# Patient Record
Sex: FEMALE | Race: WHITE | NOT HISPANIC OR LATINO | Employment: FULL TIME | URBAN - METROPOLITAN AREA
[De-identification: names, ages, dates, MRNs, and addresses within clinical notes are randomized per-mention and may not be internally consistent; named-entity substitution may affect disease eponyms.]

---

## 2017-02-02 ENCOUNTER — LAB VISIT (OUTPATIENT)
Dept: LAB | Facility: HOSPITAL | Age: 47
End: 2017-02-02
Attending: INTERNAL MEDICINE
Payer: COMMERCIAL

## 2017-02-02 DIAGNOSIS — D47.3 THROMBOCYTHEMIA, ESSENTIAL: ICD-10-CM

## 2017-02-02 DIAGNOSIS — D75.839 THROMBOCYTOSIS: ICD-10-CM

## 2017-02-02 LAB
BASOPHILS # BLD AUTO: 0.03 K/UL
BASOPHILS NFR BLD: 0.5 %
DIFFERENTIAL METHOD: ABNORMAL
EOSINOPHIL # BLD AUTO: 0.3 K/UL
EOSINOPHIL NFR BLD: 4.4 %
ERYTHROCYTE [DISTWIDTH] IN BLOOD BY AUTOMATED COUNT: 13 %
HCT VFR BLD AUTO: 38.3 %
HGB BLD-MCNC: 12.9 G/DL
LYMPHOCYTES # BLD AUTO: 1.7 K/UL
LYMPHOCYTES NFR BLD: 25.8 %
MCH RBC QN AUTO: 31.8 PG
MCHC RBC AUTO-ENTMCNC: 33.7 %
MCV RBC AUTO: 94 FL
MONOCYTES # BLD AUTO: 0.7 K/UL
MONOCYTES NFR BLD: 10.2 %
NEUTROPHILS # BLD AUTO: 3.8 K/UL
NEUTROPHILS NFR BLD: 59.1 %
NRBC BLD-RTO: 0 /100 WBC
PLATELET # BLD AUTO: 369 K/UL
PMV BLD AUTO: 10 FL
RBC # BLD AUTO: 4.06 M/UL
WBC # BLD AUTO: 6.4 K/UL

## 2017-02-02 PROCEDURE — 85025 COMPLETE CBC W/AUTO DIFF WBC: CPT | Mod: PN

## 2017-02-02 PROCEDURE — 85025 COMPLETE CBC W/AUTO DIFF WBC: CPT

## 2017-02-02 PROCEDURE — 36415 COLL VENOUS BLD VENIPUNCTURE: CPT | Mod: PN

## 2017-02-02 PROCEDURE — 81219 CALR GENE COM VARIANTS: CPT

## 2017-02-07 LAB
CALR FINAL DIAGNOSIS: NORMAL
CALR SPECIMEN TYPE: NORMAL

## 2018-12-13 ENCOUNTER — INITIAL CONSULT (OUTPATIENT)
Dept: RHEUMATOLOGY | Facility: CLINIC | Age: 48
End: 2018-12-13
Payer: COMMERCIAL

## 2018-12-13 ENCOUNTER — HOSPITAL ENCOUNTER (OUTPATIENT)
Dept: RADIOLOGY | Facility: HOSPITAL | Age: 48
Discharge: HOME | End: 2018-12-13
Attending: INTERNAL MEDICINE
Payer: COMMERCIAL

## 2018-12-13 VITALS
HEART RATE: 64 BPM | HEIGHT: 65 IN | SYSTOLIC BLOOD PRESSURE: 120 MMHG | WEIGHT: 168 LBS | BODY MASS INDEX: 27.99 KG/M2 | DIASTOLIC BLOOD PRESSURE: 80 MMHG

## 2018-12-13 DIAGNOSIS — M13.0 POLYARTHRITIS: Primary | ICD-10-CM

## 2018-12-13 DIAGNOSIS — M13.0 POLYARTHRITIS: ICD-10-CM

## 2018-12-13 PROCEDURE — 73130 X-RAY EXAM OF HAND: CPT | Mod: 50,TC,FY,PO

## 2018-12-13 PROCEDURE — 99205 OFFICE O/P NEW HI 60 MIN: CPT | Mod: S$GLB,,, | Performed by: INTERNAL MEDICINE

## 2018-12-13 PROCEDURE — 3008F BODY MASS INDEX DOCD: CPT | Mod: CPTII,S$GLB,, | Performed by: INTERNAL MEDICINE

## 2018-12-13 PROCEDURE — 99999 PR PBB SHADOW E&M-EST. PATIENT-LVL III: CPT | Mod: PBBFAC,,, | Performed by: INTERNAL MEDICINE

## 2018-12-13 PROCEDURE — 73130 X-RAY EXAM OF HAND: CPT | Mod: 26,50,, | Performed by: RADIOLOGY

## 2018-12-13 NOTE — PROGRESS NOTES
"Subjective:          Chief Complaint: Ruth Ann Dee is a 48 y.o. female who had concerns including Disease Management.    HPI:    Patient is a 48-year-old female primarily complained of fatigue earlier this year with her primary at a routine follow-up with stating that her fatigue was out of proportion to the exertion  She does have a past medical history for thrombocytosis seen with Dr. Valenzuela in past. Plt have been stable in 3-400s.   Her BEVERLEY at the most recent test 06/07/2018 is positive 1:1280 in a speckled pattern she had a previous BEVERLEY from 2015 that was negative remaining serologies of high rheumatoid factor negative x2.  Patient thus far with a hx of OA thus far.     She notes she was not feeling "well" or at her baseline for few months prior to June 2018 with generalized malaise, fatigue, she notes   No photosensitivity, no rashes. Diffuse hair loss no worrisome. No Raynaud,  No nephritis/ no hematuria  No ILD, pleurisy  No pericarditis/ +MVP stable  No IBD.   + thrombocytosis, compoleted w/up with Bianca as above. No DVT, seizure, miscarriages.x 1 <10 weeks  3 healthy kids   No thyroid thus far.     Bilateral lateral hips with pain,-PT, chiro ITB syndome. chronic  Hands: stiff painful. CMC joint pain. Right is very tender to severe, with certain motion or contact.   Does tolerate playing tennis.      Chronic since 2007 (approx)- seen with hand specialist. At that time. Negative pain with jars, bottles. Jewelry is stable.     AM stiffness is upper trapezius, cervical spine. , and greater troch region,     Family history of a mother with severe rheumatoid arthritis. Siblings with variety of differing autoimmunities: Sister: Sjogrens, Raynauds and ?DIL    Component      Latest Ref Rng & Units 6/7/2018   Rheumatoid Factor      0.0 - 15.0 IU/mL <8.6   Sed Rate      0 - 19 mm/Hr 9   CRP      0.00 - 0.90 mg/dL <0.50   HIV 1/2 Ag/Ab      Negative Negative   BEVERLEY IgG by IFA      <1:80 1:1280 (H)     REVIEW OF " SYSTEMS:    ROS            Objective:            Past Medical History:   Diagnosis Date    Bilateral Hand Osteoarthritis     10/20/15 RF, BEVERLEY, UA, ESR And CRP = Normal    Chronic Idiopathic Thrombocytosis     Dr. Kip Valenzuela's 11/20/15 OV Note Reviewed; He Ordered A Lab Work Up Ordered    Family Hx Of Breast Cancer     Family Hx Of Rheumatoid Arthritis     Her Mother  From This; 10/20/15 RF, BEVERLEY, UA, ESR And CRP = Normal    Family Hx Of SLE     10/20/15 RF, BEVERLEY, UA, ESR And CRP = Normal    Family Hx Of Thyroid Disorder     10/20/15 TSH And FT4 = Normal    Fibrocystic breast     Generalized Arthralgias C/W Fibromyalgia     10/20/15 RF, BEVERLEY, UA, ESR And CRP = Normal    Mildly Elevated Diastolic BP 2015     Echo Ordered 2015 And Low Na+ Diet RXd    MVP Ruled Out 10/2015, With H/O Palpitations     10/4/15 Echo (Dr. PERLA Olivas) = Entirely Normal     Family History   Problem Relation Age of Onset    Arthritis Mother     Cancer Mother     Hypertension Mother     Thyroid disease Mother     Osteoporosis Mother     Breast cancer Mother         50's    Hypertension Father     Breast cancer Paternal Grandmother     Breast cancer Paternal Cousin      Social History     Tobacco Use    Smoking status: Never Smoker   Substance Use Topics    Alcohol use: Not on file    Drug use: Not on file         Current Outpatient Medications on File Prior to Visit   Medication Sig Dispense Refill    meloxicam (MOBIC) 15 MG tablet Take 1 tablet (15 mg total) by mouth daily as needed for Pain. 90 tablet 1     No current facility-administered medications on file prior to visit.        Vitals:    18 1116   BP: 120/80   Pulse: 64       Physical Exam:    Physical Exam          Assessment:       Encounter Diagnosis   Name Primary?    Polyarthritis Yes          Plan:        Polyarthritis  -     Anti Sm/RNP Antibody; Future; Expected date: 2018  -     Anti-DNA antibody, double-stranded; Future; Expected date:  12/13/2018  -     Anti-Histone Antibody; Future; Expected date: 12/13/2018  -     Anti-thyroglobulin antibody; Future; Expected date: 12/13/2018  -     Anti-Smith antibody; Future; Expected date: 12/13/2018  -     C3 complement; Future; Expected date: 12/13/2018  -     C4 complement; Future; Expected date: 12/13/2018  -     Sjogrens syndrome-A extractable nuclear antibody; Future; Expected date: 12/13/2018  -     Sjogrens syndrome-B extractable nuclear antibody; Future; Expected date: 12/13/2018  -     Thyroid peroxidase antibody; Future; Expected date: 12/13/2018  -     Complement, total; Future; Expected date: 12/13/2018  -     Cyclic citrul peptide antibody, IgG; Future; Expected date: 12/13/2018    +BEVERLEY, with fatigue. No active synovitis. Suspect this is OA hands specifically as this is CMC joint involvement.   Discussed NSAIDs/tylenol, injections, bracing and ultimately surgery.   Mother with RA.   Exacerbated with cold and use.       Follow-up in about 4 months (around 4/13/2019).      60min consultation with greater than 50% spent in counseling, chart review and coordination of care. All questions answered.  Thank you for allowing me to participate in the care of this very pleasant patient.

## 2018-12-13 NOTE — PATIENT INSTRUCTIONS
[facility] [employer]  [facility] brace -[facility]/[employer]      Try Ibuprofen 600mg once or twice daily and let me know.

## 2018-12-13 NOTE — LETTER
December 23, 2018      Jasmin Swan MD  [address] [place] [address]           [place] [facility] [address] [facility] [address] [place] [address]  Phone: [phone]  Fax: [phone]          Patient: Ruth Ann Dee   MR Number: [id number]   YOB: 1970   Date of Visit: 12/13/2018       Dear Dr. Jasmin Swan:    Thank you for referring Ruth Ann Dee to me for evaluation. Attached you will find relevant portions of my assessment and plan of care.    If you have questions, please do not hesitate to call me. I look forward to following Ruth Ann Dee along with you.    Sincerely,    Maxine Potter, DO    Enclosure  CC:  No Recipients    If you would like to receive this communication electronically, please contact [email or web address] or [phone] to request more information on [employer] Link access.    For providers and/or their staff who would like to refer a patient to [facility], please contact us through our one-stop-shop provider referral line, [facility] [place], at [phone].    If you feel you have received this communication in error or would no longer like to receive these types of communications, please e-mail [email or web address]

## 2019-05-15 ENCOUNTER — OFFICE VISIT (OUTPATIENT)
Dept: RHEUMATOLOGY | Facility: CLINIC | Age: 49
End: 2019-05-15
Payer: COMMERCIAL

## 2019-05-15 VITALS
HEIGHT: 65 IN | WEIGHT: 168.31 LBS | HEART RATE: 68 BPM | BODY MASS INDEX: 28.04 KG/M2 | SYSTOLIC BLOOD PRESSURE: 133 MMHG | DIASTOLIC BLOOD PRESSURE: 86 MMHG

## 2019-05-15 DIAGNOSIS — R76.8 ANA POSITIVE: Primary | ICD-10-CM

## 2019-05-15 PROCEDURE — 99214 PR OFFICE/OUTPT VISIT, EST, LEVL IV, 30-39 MIN: ICD-10-PCS | Mod: S$GLB,,, | Performed by: INTERNAL MEDICINE

## 2019-05-15 PROCEDURE — 99214 OFFICE O/P EST MOD 30 MIN: CPT | Mod: S$GLB,,, | Performed by: INTERNAL MEDICINE

## 2019-05-15 PROCEDURE — 3008F BODY MASS INDEX DOCD: CPT | Mod: CPTII,S$GLB,, | Performed by: INTERNAL MEDICINE

## 2019-05-15 PROCEDURE — 3008F PR BODY MASS INDEX (BMI) DOCUMENTED: ICD-10-PCS | Mod: CPTII,S$GLB,, | Performed by: INTERNAL MEDICINE

## 2019-05-15 PROCEDURE — 99999 PR PBB SHADOW E&M-EST. PATIENT-LVL III: ICD-10-PCS | Mod: PBBFAC,,, | Performed by: INTERNAL MEDICINE

## 2019-05-15 PROCEDURE — 99999 PR PBB SHADOW E&M-EST. PATIENT-LVL III: CPT | Mod: PBBFAC,,, | Performed by: INTERNAL MEDICINE

## 2019-05-15 RX ORDER — CYCLOSPORINE 0.5 MG/ML
EMULSION OPHTHALMIC
COMMUNITY
Start: 2019-02-13 | End: 2020-10-02

## 2019-05-15 RX ORDER — ASPIRIN 81 MG/1
81 TABLET ORAL DAILY
COMMUNITY

## 2019-05-15 ASSESSMENT — ROUTINE ASSESSMENT OF PATIENT INDEX DATA (RAPID3)
MDHAQ FUNCTION SCORE: 0
PSYCHOLOGICAL DISTRESS SCORE: 0
PATIENT GLOBAL ASSESSMENT SCORE: 0
TOTAL RAPID3 SCORE: 1
PAIN SCORE: 3

## 2019-05-15 NOTE — PROGRESS NOTES
"Subjective:          Chief Complaint: Ruth Ann Dee is a 48 y.o. female who had concerns including Polyarthritis (4 months).    HPI:    Patient is a 48-year-old female primarily complained of fatigue earlier this year with her primary at a routine follow-up with stating that her fatigue was out of proportion to the exertion  She does have a past medical history for thrombocytosis seen with Dr. Valenzuela in past. Plt have been stable in 3-400s.   Did about 1 month ago have typic excessive fatigue this last spring.     Her BEVERLEY at the most recent test 06/07/2018 is positive 1:1280 in a speckled pattern she had a previous BEVERLEY from 2015 that was negative remaining serologies negative, rheumatoid factor negative x2. +TPO Ab.   ESR and CRP WNL  Patient thus far with a hx of OA thus far.   We tried using Ibuprofen with gastritis, she was using Mobic PRN stopped this. She is doing well aleve.         She notes she was not feeling "well" or at her baseline for few months prior to June 2018 with generalized malaise, fatigue, she notes   No photosensitivity, no rashes. Diffuse hair loss no worrisome. No Raynaud,  No nephritis/ no hematuria  No ILD, pleurisy  No pericarditis/ +MVP stable  No IBD.   + thrombocytosis, compoleted w/up with Bianca as above. No DVT, seizure, miscarriages.x 1 <10 weeks  3 healthy kids   No thyroid thus far.     Bilateral lateral hips with pain,-PT, chiro ITB syndome. chronic  Hands: stiff painful. CMC joint pain. Right is very tender to severe, with certain motion or contact.   Does tolerate playing tennis.      Chronic since 2007 (approx)- seen with hand specialist. At that time. Negative pain with jars, bottles. Jewelry is stable.     AM stiffness is upper trapezius, cervical spine. , and greater troch region,     Family history of a mother with severe rheumatoid arthritis. Siblings with variety of differing autoimmunities: Sister: Sjogrens, Raynauds and ?DIL    Component      Latest Ref Rng & Units " 12/13/2018 6/7/2018   Anti Sm/RNP Antibody      0.00 - 19.99 EU 2.87    Anti-Sm/RNP Interpretation      Negative Negative    Anti Sm Antibody      0.00 - 19.99 EU 1.68    Anti-Sm Interpretation      Negative Negative    Anti-SSA Antibody      0.00 - 19.99 EU 2.54    Anti-SSA Interpretation      Negative Negative    Anti-SSB Antibody      0.00 - 19.99 EU 0.98    Anti-SSB Interpretation      Negative Negative    Rheumatoid Factor      0.0 - 15.0 IU/mL  <8.6   HIV 1/2 Ag/Ab      Negative  Negative   BEVERLEY IgG by IFA      <1:80  1:1280 (H)   ds DNA Ab      Negative 1:10 Negative 1:10    Anti-Histone Antibody      0.0 - 0.9 Units 2.8 (H)    Thyroglobulin Ab Screen      0.0 - 3.9 IU/mL <4.0    Complement (C-3)      50 - 180 mg/dL 101    Complement (C-4)      11 - 44 mg/dL 17    Thyroperoxidase Antibodies      <6.0 IU/mL 11.0 (H)    Complement,Total, Serum      42 - 95 U/mL 51    CCP Antibodies      <5.0 U/mL 0.8      Component      Latest Ref Rng & Units 6/7/2018   Rheumatoid Factor      0.0 - 15.0 IU/mL <8.6   Sed Rate      0 - 19 mm/Hr 9   CRP      0.00 - 0.90 mg/dL <0.50   HIV 1/2 Ag/Ab      Negative Negative   BEVERLEY IgG by IFA      <1:80 1:1280 (H)     REVIEW OF SYSTEMS:    Review of Systems   Constitutional: Negative for fever, malaise/fatigue and weight loss.   HENT: Negative for sore throat.    Eyes: Negative for double vision, photophobia and redness.   Respiratory: Negative for cough, shortness of breath and wheezing.    Cardiovascular: Negative for chest pain, palpitations and orthopnea.   Gastrointestinal: Negative for abdominal pain, constipation and diarrhea.   Genitourinary: Negative for dysuria, hematuria and urgency.   Musculoskeletal: Positive for joint pain. Negative for back pain and myalgias.   Skin: Negative for rash.   Neurological: Negative for dizziness, tingling, focal weakness and headaches.   Endo/Heme/Allergies: Does not bruise/bleed easily.   Psychiatric/Behavioral: Negative for depression,  hallucinations and suicidal ideas.               Objective:            Past Medical History:   Diagnosis Date    Bilateral Hand Osteoarthritis     10/20/15 RF, BEVERLEY, UA, ESR And CRP = Normal    Chronic Idiopathic Thrombocytosis     Dr. Kip Valenzuela's 11/20/15 OV Note Reviewed; He Ordered A Lab Work Up Ordered    Family Hx Of Breast Cancer     Family Hx Of Rheumatoid Arthritis     Her Mother  From This; 10/20/15 RF, BEVERLEY, UA, ESR And CRP = Normal    Family Hx Of SLE     10/20/15 RF, BEVERLEY, UA, ESR And CRP = Normal    Family Hx Of Thyroid Disorder     10/20/15 TSH And FT4 = Normal    Fibrocystic breast     Generalized Arthralgias C/W Fibromyalgia     10/20/15 RF, BEVERLEY, UA, ESR And CRP = Normal    Mildly Elevated Diastolic BP 2015     Echo Ordered 2015 And Low Na+ Diet RXd    MVP Ruled Out 10/2015, With H/O Palpitations     10/4/15 Echo (Dr. PERLA Olivas) = Entirely Normal     Family History   Problem Relation Age of Onset    Arthritis Mother     Cancer Mother     Hypertension Mother     Thyroid disease Mother     Osteoporosis Mother     Breast cancer Mother         50's    Hypertension Father     Breast cancer Paternal Grandmother     Breast cancer Paternal Cousin      Social History     Tobacco Use    Smoking status: Never Smoker   Substance Use Topics    Alcohol use: Not on file    Drug use: Not on file         Current Outpatient Medications on File Prior to Visit   Medication Sig Dispense Refill    RESTASIS MULTIDOSE 0.05 % Drop       meloxicam (MOBIC) 15 MG tablet Take 1 tablet (15 mg total) by mouth daily as needed for Pain. 90 tablet 1     No current facility-administered medications on file prior to visit.        Vitals:    05/15/19 1005   BP: 133/86   Pulse: 68       Physical Exam:    Physical Exam   Constitutional: She is oriented to person, place, and time. She appears well-developed and well-nourished.   HENT:   Head: Normocephalic and atraumatic.   Mouth/Throat: Oropharynx is clear  and moist.   Eyes: Pupils are equal, round, and reactive to light. EOM are normal.   Neck: Normal range of motion.   Cardiovascular: Normal rate, regular rhythm and normal heart sounds.   Pulmonary/Chest: Effort normal and breath sounds normal.   Musculoskeletal:        Right shoulder: She exhibits normal range of motion, no tenderness and no swelling.        Left shoulder: She exhibits normal range of motion, no tenderness and no swelling.        Right elbow: She exhibits normal range of motion and no swelling. No tenderness found.        Left elbow: She exhibits normal range of motion and no swelling. No tenderness found.        Right wrist: She exhibits normal range of motion, no tenderness and no swelling.        Left wrist: She exhibits normal range of motion, no tenderness and no swelling.        Right knee: She exhibits normal range of motion and no swelling. No tenderness found.        Left knee: She exhibits normal range of motion and no swelling. No tenderness found.        Right hand: She exhibits normal range of motion, no tenderness and no swelling.        Left hand: She exhibits normal range of motion, no tenderness and no swelling.        Right foot: There is normal range of motion, no tenderness and no swelling.        Left foot: There is normal range of motion, no tenderness and no swelling.   Neurological: She is alert and oriented to person, place, and time.   Skin: Skin is warm and dry.   Psychiatric: She has a normal mood and affect. Her behavior is normal.             Assessment:       Encounter Diagnosis   Name Primary?    BEVERLEY positive Yes          Plan:        BEVERLEY positive  Comments:  I am not finding any active systemic disease today.          +BEVERLEY 1:1280 with negative SUE  , with fatigue. No active synovitis. Suspect this is OA hands specifically as this is CMC joint involvement.   Discussed NSAIDs/tylenol, injections, bracing and ultimately surgery.     Mother with RA.   Exacerbated with  cold and use.       Follow up in about 4 months (around 9/15/2019).      30min consultation with greater than 50% spent in counseling, chart review and coordination of care. All questions answered.  Thank you for allowing me to participate in the care of this very pleasant patient.

## 2020-09-14 ENCOUNTER — OFFICE VISIT (OUTPATIENT)
Dept: ENDOCRINOLOGY | Facility: CLINIC | Age: 50
End: 2020-09-14
Payer: COMMERCIAL

## 2020-09-14 VITALS
WEIGHT: 138.69 LBS | HEIGHT: 65 IN | OXYGEN SATURATION: 98 % | HEART RATE: 64 BPM | DIASTOLIC BLOOD PRESSURE: 72 MMHG | BODY MASS INDEX: 23.11 KG/M2 | SYSTOLIC BLOOD PRESSURE: 126 MMHG

## 2020-09-14 DIAGNOSIS — F41.9 ANXIETY: ICD-10-CM

## 2020-09-14 DIAGNOSIS — R00.2 PALPITATIONS: ICD-10-CM

## 2020-09-14 DIAGNOSIS — R94.6 ABNORMAL THYROID FUNCTION TEST: Primary | ICD-10-CM

## 2020-09-14 PROCEDURE — 3008F PR BODY MASS INDEX (BMI) DOCUMENTED: ICD-10-PCS | Mod: CPTII,S$GLB,, | Performed by: INTERNAL MEDICINE

## 2020-09-14 PROCEDURE — 99204 OFFICE O/P NEW MOD 45 MIN: CPT | Mod: S$GLB,,, | Performed by: INTERNAL MEDICINE

## 2020-09-14 PROCEDURE — 3008F BODY MASS INDEX DOCD: CPT | Mod: CPTII,S$GLB,, | Performed by: INTERNAL MEDICINE

## 2020-09-14 PROCEDURE — 99999 PR PBB SHADOW E&M-EST. PATIENT-LVL IV: ICD-10-PCS | Mod: PBBFAC,,, | Performed by: INTERNAL MEDICINE

## 2020-09-14 PROCEDURE — 99204 PR OFFICE/OUTPT VISIT, NEW, LEVL IV, 45-59 MIN: ICD-10-PCS | Mod: S$GLB,,, | Performed by: INTERNAL MEDICINE

## 2020-09-14 PROCEDURE — 99999 PR PBB SHADOW E&M-EST. PATIENT-LVL IV: CPT | Mod: PBBFAC,,, | Performed by: INTERNAL MEDICINE

## 2020-09-14 NOTE — ASSESSMENT & PLAN NOTE
H/o MVP now with almost daily palpations per pt. Bhargav r/o hyperthyroidism. Follow up with PCP. Pt likely needs baseline electrolytes checked and consider further cardiac testing/monitor.

## 2020-09-14 NOTE — PROGRESS NOTES
"    Subjective:    Patient ID:  Ruth Ann Dee is a 49 y.o. female.    Chief Complaint:  abnormal thyroid function test      Pt presents to establish care for abnormal thyroid function test.    Has history of mildly TPO antibodies in the past. PCP recently did blood work that showed elevated FT3/FT4 and normal TSH. Saw PCP for "stomach issues" and after ROS was decided to check thyroid test.    With regards to abnl thyroid test:    Not currently on any anti-thyroid or thyroid medication:    Current symptoms:   weight loss +ve but intentional 33 lb weight loss. On optivia diet since March.  fatigue -ve  Diarrhea +ve. Saw PCP for those sx's which are now improved. Notes alternating diarrhea and constipation   hair loss +ve  No hoarseness, voice changes, dysphagia, compressive symptoms, or head/neck exposure to XRT. No personal or FH of thyroid cancer or MEN syndrome. Mom had goiter.    Denies biotin use. No recent fever, URI, or TTP or thyroid. Denies excessive intake of kelp, seaweed, or iodine. Denies any herbal medications. Take MVI only. Denies recent contrast dye exposure. Denies taking amiodarone or lithium.     Thyroid ultrasound 9/14/2020     Narrative & Impression    EXAMINATION:  US SOFT TISSUE HEAD NECK THYROID     CLINICAL HISTORY:  Slow transit constipation     COMPARISON:  None.     FINDINGS:  Right thyroid lobe measures 4.6 x 1.4 x 1.2 cm.  Left thyroid lobe measures 4.8 x 1.0 x 1.5 cm.  Isthmus measures 0.1 cm.     Gland has a homogeneous echotexture.  No nodules are present.     Impression:     Normal sonographic appearance of the thyroid gland        Electronically signed by: Nirav Prince MD  Date:                                            09/14/2020              Review of Systems   Constitutional: Negative for diaphoresis, fatigue and unexpected weight change.   HENT: Negative for trouble swallowing and voice change.    Eyes: Negative for visual disturbance.   Respiratory: Negative for " shortness of breath.    Cardiovascular: Positive for palpitations. Negative for chest pain.   Gastrointestinal: Negative for abdominal pain.   Endocrine: Negative for cold intolerance and heat intolerance.   Musculoskeletal: Negative for myalgias.   Skin: Negative for wound.   Neurological: Negative for tremors and headaches.   Hematological: Negative for adenopathy.   Psychiatric/Behavioral: Negative for sleep disturbance. The patient is nervous/anxious.         Past Medical History:   Diagnosis Date    Bilateral Hand Osteoarthritis     10/20/15 RF, BEVERLEY, UA, ESR And CRP = Normal    Chronic Idiopathic Thrombocytosis     Dr. Kip Valenzuela's 11/20/15 OV Note Reviewed; He Ordered A Lab Work Up Ordered    Family Hx Of Breast Cancer     Family Hx Of Rheumatoid Arthritis     Her Mother  From This; 10/20/15 RF, BEVERLEY, UA, ESR And CRP = Normal    Family Hx Of SLE     10/20/15 RF, BEVERLEY, UA, ESR And CRP = Normal    Family Hx Of Thyroid Disorder     10/20/15 TSH And FT4 = Normal    Fibrocystic breast     Generalized Arthralgias C/W Fibromyalgia     10/20/15 RF, BEVERLEY, UA, ESR And CRP = Normal    Mildly Elevated Diastolic BP 2015     Echo Ordered 2015 And Low Na+ Diet RXd    MVP Ruled Out 10/2015, With H/O Palpitations     10/4/15 Echo (Dr. PERLA Olivas) = Entirely Normal      Social History     Tobacco Use    Smoking status: Never Smoker    Smokeless tobacco: Never Used   Substance Use Topics    Alcohol use: Yes     Alcohol/week: 0.0 standard drinks     Frequency: 2-4 times a month    Drug use: Not on file     Family History   Problem Relation Age of Onset    Arthritis Mother     Cancer Mother     Hypertension Mother     Thyroid disease Mother     Osteoporosis Mother     Breast cancer Mother         50's    Thyroid nodules Mother     Hypertension Father     Breast cancer Paternal Grandmother         age unknown    Breast cancer Paternal Cousin         age unknown    Raynaud syndrome Sister       Past  "Surgical History:   Procedure Laterality Date    BUNIONECTOMY      TUBAL LIGATION            Current Outpatient Medications:     aspirin (ECOTRIN) 81 MG EC tablet, Take 81 mg by mouth once daily., Disp: , Rfl:     MULTIVITAMIN ORAL, Take by mouth., Disp: , Rfl:     ondansetron (ZOFRAN ODT) 8 MG TbDL, Take 1 tablet (8 mg total) by mouth every 8 (eight) hours as needed., Disp: 20 tablet, Rfl: 0    tranexamic acid (LYSTEDA) 650 mg tablet, Take 650 mg by mouth. At onset of cycle, Disp: , Rfl:     psyllium (KONSYL) Powd, Take 5 mLs by mouth once daily. (Patient not taking: Reported on 9/14/2020), Disp: , Rfl:     RESTASIS MULTIDOSE 0.05 % Drop, , Disp: , Rfl:      Review of patient's allergies indicates:  No Known Allergies     Objective:   /72   Pulse 64   Ht 5' 5" (1.651 m)   Wt 62.9 kg (138 lb 10.7 oz)   SpO2 98%   BMI 23.08 kg/m²   BP Readings from Last 3 Encounters:   09/14/20 126/72   09/09/20 138/88   05/15/19 133/86     Wt Readings from Last 3 Encounters:   09/14/20 1405 62.9 kg (138 lb 10.7 oz)   09/09/20 1654 62.3 kg (137 lb 4.8 oz)   06/28/19 1152 76.2 kg (168 lb)          Physical Exam  Vitals signs reviewed.   Constitutional:       General: She is not in acute distress.     Appearance: She is well-developed.   HENT:      Head: Normocephalic and atraumatic.      Nose: Nose normal.   Eyes:      General: No scleral icterus.  Neck:      Thyroid: No thyromegaly.      Trachea: No tracheal deviation.      Comments:  No thyromegaly or palpable thyroid nodules  No thyroid bruit or TTP of thyroid  Cardiovascular:      Rate and Rhythm: Normal rate and regular rhythm.      Heart sounds: Normal heart sounds. No murmur.   Pulmonary:      Effort: Pulmonary effort is normal.      Breath sounds: Normal breath sounds. No wheezing or rales.   Abdominal:      General: Bowel sounds are normal. There is no distension.      Palpations: Abdomen is soft.      Tenderness: There is no abdominal tenderness. "   Musculoskeletal:         General: No swelling.   Lymphadenopathy:      Cervical: No cervical adenopathy.   Skin:     General: Skin is warm.   Neurological:      Mental Status: She is alert and oriented to person, place, and time.      Cranial Nerves: No cranial nerve deficit.      Deep Tendon Reflexes: Reflexes normal.      Comments: No tremor of hands     Psychiatric:         Thought Content: Thought content normal.           Lab Results   Component Value Date     06/07/2018     10/20/2015    K 4.4 06/07/2018    K 4.6 10/20/2015     06/07/2018     10/20/2015    CO2 27 06/07/2018    BUN 12 06/07/2018    CREATININE 0.60 06/07/2018    CREATININE 0.72 10/20/2015    GLU 79 06/07/2018    AST 17 06/07/2018    AST 21 12/11/2015    ALT 32 06/07/2018    ALBUMIN 4.2 06/07/2018    ALBUMIN 4.0 10/20/2015    PROT 7.4 06/07/2018    BILITOT 0.4 06/07/2018    WBC 9.40 06/07/2018    HGB 13.5 06/07/2018    HCT 40.3 06/07/2018    MCV 93 06/07/2018    MCH 31.3 (H) 06/07/2018     (H) 06/07/2018    MPV 10.8 06/07/2018    GRAN 6.2 06/07/2018    GRAN 66.1 06/07/2018    LYMPH 2.2 06/07/2018    LYMPH 23.6 06/07/2018    CHOL 155 06/07/2018    HDL 57 06/07/2018    LDLCALC 85.8 06/07/2018    LDLCALC 77 10/20/2015    TRIG 61 06/07/2018       Lab Results   Component Value Date    TSH 0.878 09/09/2020    FREET4 2.35 (H) 09/09/2020        Thyroid Labs Latest Ref Rng & Units 6/7/2018 12/13/2018 9/9/2020   TSH 0.400 - 4.000 uIU/mL 1.210 - 0.878   Free T4 0.78 - 2.19 ng/dL 1.20 - 2.35(H)   Sodium 136 - 145 mmol/L 139 - -   Potassium 3.5 - 5.1 mmol/L 4.4 - -   Chloride 95 - 110 mmol/L 101 - -   Carbon Dioxide 22 - 31 mmol/L 27 - -   Glucose 70 - 110 mg/dL 79 - -   Blood Urea Nitrogen 7 - 18 mg/dL 12 - -   Creatinine 0.50 - 1.40 mg/dL 0.60 - -   Calcium 8.4 - 10.2 mg/dL 9.7 - -   Total Protein 6.0 - 8.4 g/dL 7.4 - -   Albumin 3.5 - 5.2 g/dL 4.2 - -   Total Bilirubin 0.2 - 1.3 mg/dL 0.4 - -   AST 14 - 36 U/L 17 - -   ALT  10 - 44 U/L 32 - -   Anion Gap 8 - 16 mmol/L 11 - -   eGFR (African American) >60 mL/min/1.73 m:2 >60 - -   eGFR (Non-African American) >60 mL/min/1.73 m:2 >60 - -   WBC 3.90 - 12.70 K/uL 9.40 - -   RBC 4.00 - 5.40 M/uL 4.32 - -   Hemoglobin 12.0 - 16.0 g/dL 13.5 - -   Hematocrit 37.0 - 48.5 % 40.3 - -   MCV 82 - 98 fL 93 - -   MCH 27.0 - 31.0 pg 31.3(H) - -   MCHC 32.0 - 36.0 g/dL 33.5 - -   RDW 11.5 - 14.5 % 12.8 - -   Platelets 150 - 350 K/uL 362(H) - -   MPV 9.2 - 12.9 fL 10.8 - -   Gran # 1.8 - 7.7 K/uL 6.2 - -   Lymph # 1.0 - 4.8 K/uL 2.2 - -   Mono # 0.3 - 1.0 K/uL 0.8 - -   Eos # 0.0 - 0.5 K/uL 0.1 - -   Baso # 0.00 - 0.20 K/uL 0.03 - -   Gran % 38.0 - 73.0 % 66.1 - -   Lymph % 18.0 - 48.0 % 23.6 - -   Mono% 4.0 - 15.0 % 8.5 - -   Eos % 0.0 - 8.0 % 1.5 - -   Baso % 0.0 - 1.9 % 0.3 - -   Thyroglobulin, Antibody Screen 0.0 - 3.9 IU/mL - <4.0 -   Thyroperoxidase Antibodies 0.0 - 9.0 IU/mL - 11.0(H) 4.5         No results found for: HGBA1C      Assessment and plan:       Problem List Items Addressed This Visit        Psychiatric    Anxiety    Current Assessment & Plan     Will r/o overactive thyroid. Would also recommend work up for other causes.            Cardiac/Vascular    Palpitations    Current Assessment & Plan     H/o MVP now with almost daily palpations per pt. Bhargav r/o hyperthyroidism. Follow up with PCP. Pt likely needs baseline electrolytes checked and consider further cardiac testing/monitor.         Relevant Orders    T4    T3    T4, FREE, DIRECT DIALYSIS    TSH       Endocrine    Abnormal thyroid function test - Primary    Current Assessment & Plan     Clinically pt with anxiety and palpations. However, sx's not consistent with janes thyrotoxicosis and FT3 of 17. Suspect lab error in measuring free thyroid hormone levels. Will check total levels to confirm. Repeat TPO ab neg and thyroid ultrasound wnls.          Relevant Orders    T4    T3    T4, FREE, DIRECT DIALYSIS    TSH            Labs  now  RTC in prn

## 2020-09-14 NOTE — ASSESSMENT & PLAN NOTE
Clinically pt with anxiety and palpations. However, sx's not consistent with janes thyrotoxicosis and FT3 of 17. Suspect lab error in measuring free thyroid hormone levels. Will check total levels to confirm. Repeat TPO ab neg and thyroid ultrasound wnls.

## 2020-09-22 ENCOUNTER — PATIENT MESSAGE (OUTPATIENT)
Dept: ENDOCRINOLOGY | Facility: CLINIC | Age: 50
End: 2020-09-22

## 2020-10-02 PROBLEM — R94.6 ABNORMAL THYROID FUNCTION TEST: Status: RESOLVED | Noted: 2020-09-14 | Resolved: 2020-10-02

## 2020-11-13 PROBLEM — N32.81 OAB (OVERACTIVE BLADDER): Status: ACTIVE | Noted: 2020-11-13

## 2021-04-29 ENCOUNTER — PATIENT MESSAGE (OUTPATIENT)
Dept: RESEARCH | Facility: HOSPITAL | Age: 51
End: 2021-04-29

## 2021-07-19 ENCOUNTER — PATIENT MESSAGE (OUTPATIENT)
Dept: RHEUMATOLOGY | Facility: CLINIC | Age: 51
End: 2021-07-19

## 2022-02-26 PROBLEM — K81.0 ACUTE CHOLECYSTITIS: Status: ACTIVE | Noted: 2022-02-26

## 2022-03-10 ENCOUNTER — PATIENT MESSAGE (OUTPATIENT)
Dept: RHEUMATOLOGY | Facility: CLINIC | Age: 52
End: 2022-03-10
Payer: COMMERCIAL

## 2022-08-15 ENCOUNTER — OFFICE VISIT (OUTPATIENT)
Dept: UROLOGY | Facility: CLINIC | Age: 52
End: 2022-08-15
Payer: COMMERCIAL

## 2022-08-15 VITALS
BODY MASS INDEX: 24.94 KG/M2 | DIASTOLIC BLOOD PRESSURE: 93 MMHG | HEART RATE: 70 BPM | SYSTOLIC BLOOD PRESSURE: 149 MMHG | WEIGHT: 149.69 LBS | HEIGHT: 65 IN

## 2022-08-15 DIAGNOSIS — N39.46 MIXED STRESS AND URGE URINARY INCONTINENCE: Primary | ICD-10-CM

## 2022-08-15 PROCEDURE — 1159F PR MEDICATION LIST DOCUMENTED IN MEDICAL RECORD: ICD-10-PCS | Mod: CPTII,S$GLB,, | Performed by: UROLOGY

## 2022-08-15 PROCEDURE — 3080F DIAST BP >= 90 MM HG: CPT | Mod: CPTII,S$GLB,, | Performed by: UROLOGY

## 2022-08-15 PROCEDURE — 3008F PR BODY MASS INDEX (BMI) DOCUMENTED: ICD-10-PCS | Mod: CPTII,S$GLB,, | Performed by: UROLOGY

## 2022-08-15 PROCEDURE — 99999 PR PBB SHADOW E&M-EST. PATIENT-LVL III: ICD-10-PCS | Mod: PBBFAC,,, | Performed by: UROLOGY

## 2022-08-15 PROCEDURE — 99999 PR PBB SHADOW E&M-EST. PATIENT-LVL III: CPT | Mod: PBBFAC,,, | Performed by: UROLOGY

## 2022-08-15 PROCEDURE — 99204 PR OFFICE/OUTPT VISIT, NEW, LEVL IV, 45-59 MIN: ICD-10-PCS | Mod: S$GLB,,, | Performed by: UROLOGY

## 2022-08-15 PROCEDURE — 3077F PR MOST RECENT SYSTOLIC BLOOD PRESSURE >= 140 MM HG: ICD-10-PCS | Mod: CPTII,S$GLB,, | Performed by: UROLOGY

## 2022-08-15 PROCEDURE — 3077F SYST BP >= 140 MM HG: CPT | Mod: CPTII,S$GLB,, | Performed by: UROLOGY

## 2022-08-15 PROCEDURE — 3080F PR MOST RECENT DIASTOLIC BLOOD PRESSURE >= 90 MM HG: ICD-10-PCS | Mod: CPTII,S$GLB,, | Performed by: UROLOGY

## 2022-08-15 PROCEDURE — 3008F BODY MASS INDEX DOCD: CPT | Mod: CPTII,S$GLB,, | Performed by: UROLOGY

## 2022-08-15 PROCEDURE — 99204 OFFICE O/P NEW MOD 45 MIN: CPT | Mod: S$GLB,,, | Performed by: UROLOGY

## 2022-08-15 PROCEDURE — 1159F MED LIST DOCD IN RCRD: CPT | Mod: CPTII,S$GLB,, | Performed by: UROLOGY

## 2022-08-15 RX ORDER — PROGESTERONE 200 MG/1
200 CAPSULE ORAL NIGHTLY
COMMUNITY
Start: 2022-04-18 | End: 2023-02-01 | Stop reason: ALTCHOICE

## 2022-08-15 RX ORDER — LIDOCAINE HYDROCHLORIDE 20 MG/ML
JELLY TOPICAL ONCE
Status: CANCELLED | OUTPATIENT
Start: 2022-08-15 | End: 2022-08-15

## 2022-08-15 RX ORDER — DOXYCYCLINE HYCLATE 100 MG
100 TABLET ORAL ONCE
Status: CANCELLED | OUTPATIENT
Start: 2022-08-15 | End: 2022-08-15

## 2022-08-15 NOTE — PROGRESS NOTES
CHIEF COMPLAINT:    Mrs. Dee is a 51 y.o. female presenting as a self referred patient for mixed urinary incontinence.     PRESENTING ILLNESS:    Ruth Ann Dee is a 51 y.o. female who states she can recall having mixed incontinence since childhood.  She recalls episodes when she was 11 or 12 years old, playing in the bounce house and leaking urine on the slide.  As an 8 or 8th grader playing bumper car basket ball and losing large amounts of urine while laughing then bumping cars.  She has had large volume losses laughing in stores with her cousins.  She has been in situations where she had to use the waste paper basket in her office.  She did not see a urologist when she was a child because she did not complain to her mother about her incontinence.  She likes to play tennis and uses a large pad which is soaked afterwards.  She sometimes has bladder spasms.  No gross hematuria or recurrent UTI.  She goes through a I3 pad and changes it 3 times a day.  Nocturia x 0, but her first am void has significant urgency.    She states she was evaluated by Dr. Kassidy Vale who did what sounds like urodynamics, the patient tried Gelnique and Detrol LA.  Had an OK response. But not enough to continue medication.    She does urge suppression sits in her car, or tries to concentrate hard not to urinate before then going to the toilet.  This does not always work.  She is the  at [employer] in [place].    , vaginal deliveries, her largest child was 9 lb 2 oz, sexually active, no pain.  Bowels are normal.     REVIEW OF SYSTEMS:    Review of Systems   Constitutional: Negative.    HENT: Negative.    Eyes: Negative.    Respiratory: Negative.    Cardiovascular: Negative.    Gastrointestinal: Negative.    Genitourinary: Positive for frequency and urgency.        May have stress induced urge incontinence   Musculoskeletal: Negative.    Skin: Negative.    Neurological: Negative.     Endo/Heme/Allergies: Negative.    Psychiatric/Behavioral: Negative.        PATIENT HISTORY:    Past Medical History:   Diagnosis Date    a H/O Palpitations     a Mitral Valve Prolapse Ruled Out     10/4/15 Echo (Dr. PERLA Olivas) = Entirely Normal    e Abnormal Thyroid Function Tests     Dr. Juliette Sandifer ([facility]); Repeat TFTs Were Entirely Normal; In 2020    e Family Hx Of Thyroid Disorder     10/20/15 TSH And FT4 = Normal    g Chronic Idiopathic Thrombocytosis     Dr. Kip Valenzuela's 11/20/15 OV Note Reviewed; He Ordered A Lab Work Up Ordered    j Ascending Colon Diverticulosis     Dr. Kyle London    j Internal Hemorrhoids     Dr. Kyle mclain Family Hx Of Breast Cancer     k Fibrocystic Breast Changes     l Bilateral Hand Osteoarthritis     10/20/15 RF, BEVERLEY, UA, ESR And CRP = Normal    l Family Hx Of Rheumatoid Arthritis     Her Mother  From This; 10/20/15 RF, BEVERLEY, UA, ESR And CRP = Normal    l Family Hx Of SLE     10/20/15 RF, BEVERLEY, UA, ESR And CRP = Normal    l Generalized Arthralgias C/W Fibromyalgia ###    10/20/15 RF, BEVERLEY, UA, ESR And CRP = Normal    o Alopecia     10/2/20 Referred To Dr. Lilli Drummond    Wellness Visit 10/02/2020        Past Surgical History:   Procedure Laterality Date    breast lift      BREAST SURGERY      BUNIONECTOMY      COLONOSCOPY  2018    Dr. London    LAPAROSCOPIC CHOLECYSTECTOMY N/A 2022    Procedure: CHOLECYSTECTOMY, LAPAROSCOPIC;  Surgeon: Rosie Alejandra MD;  [facility];  Service: General;  Laterality: N/A;    TUBAL LIGATION         Family History   Problem Relation Age of Onset    Arthritis Mother     Cancer Mother     Hypertension Mother     Thyroid disease Mother     Osteoporosis Mother     Breast cancer Mother         50's    Thyroid nodules Mother     Hypertension Father     Pacemaker/defibrilator Father 76    Breast cancer Paternal Grandmother         age unknown    Breast cancer Paternal Cousin          age unknown    Raynaud syndrome Sister     Heart attack Cousin 47        (mom's side)     Heart attack Paternal Grandfather 82       Social History     Socioeconomic History    Marital status:    Tobacco Use    Smoking status: Never Smoker    Smokeless tobacco: Never Used   Substance and Sexual Activity    Alcohol use: Yes     Comment: occaisionally       Allergies:  Patient has no known allergies.    Medications:  Outpatient Encounter Medications as of 8/15/2022   Medication Sig Dispense Refill    ascorbic acid/zinc (ZINC WITH VITAMIN C ORAL) Take 3 tablets by mouth once daily.      aspirin (ECOTRIN) 81 MG EC tablet Take 81 mg by mouth once daily.      MULTIVITAMIN ORAL Take 1 tablet by mouth once daily.      vitamin D (VITAMIN D3) 1000 units Tab Take 1,000 Units by mouth once daily.      progesterone (PROMETRIUM) 200 MG capsule Take by mouth.       No facility-administered encounter medications on file as of 8/15/2022.         PHYSICAL EXAMINATION:    The patient generally appears in good health, is appropriately interactive, and is in no apparent distress.    Skin: No lesions.    Mental: Cooperative with normal affect.    Neuro: Grossly intact.    HEENT: Normal. No evidence of lymphadenopathy.    Chest:  normal inspiratory effort.    Abdomen:  Soft, non-tender. No masses or organomegaly. Bladder is not palpable. No evidence of flank discomfort. No evidence of inguinal hernia.    Extremities: No clubbing, cyanosis, or edema    Normal external female genitalia  Urethral meatus is normal  Urethra and bladder are nontender to bimanual exam  Well supported anteriorly and posteriorly   Uterus and cervix are normal  No adnexal masses  PVR by catheterization was 10 ml  Urethral mobility from 0-60 degrees    LABS:    Lab Results   Component Value Date    BUN 12 02/26/2022    CREATININE 0.60 02/26/2022     1.015, pH 7, otherwise, negative    IMPRESSION:    Encounter Diagnoses   Name Primary?     Mixed stress and urge urinary incontinence Yes       PLAN:    1.  For SUDS/cysto.  This may be stress induced urge incontinence.    2.  Incontinence brochure provided    I spent 45 minutes with the patient of which more than half was spent in direct consultation with the patient in regards to our treatment and plan.

## 2022-09-14 ENCOUNTER — PROCEDURE VISIT (OUTPATIENT)
Dept: UROLOGY | Facility: CLINIC | Age: 52
End: 2022-09-14
Payer: COMMERCIAL

## 2022-09-14 VITALS
BODY MASS INDEX: 24.99 KG/M2 | HEIGHT: 65 IN | TEMPERATURE: 99 F | HEART RATE: 70 BPM | SYSTOLIC BLOOD PRESSURE: 136 MMHG | WEIGHT: 150 LBS | DIASTOLIC BLOOD PRESSURE: 82 MMHG

## 2022-09-14 DIAGNOSIS — N39.46 MIXED STRESS AND URGE URINARY INCONTINENCE: ICD-10-CM

## 2022-09-14 PROCEDURE — 52000 PR CYSTOURETHROSCOPY: ICD-10-PCS | Mod: 59,S$GLB,, | Performed by: UROLOGY

## 2022-09-14 PROCEDURE — 51741 ELECTRO-UROFLOWMETRY FIRST: CPT | Mod: 26,51,S$GLB, | Performed by: UROLOGY

## 2022-09-14 PROCEDURE — 51784 ANAL/URINARY MUSCLE STUDY: CPT | Mod: 26,51,S$GLB, | Performed by: UROLOGY

## 2022-09-14 PROCEDURE — 51797 INTRAABDOMINAL PRESSURE TEST: CPT | Mod: 26,S$GLB,, | Performed by: UROLOGY

## 2022-09-14 PROCEDURE — 51728 CYSTOMETROGRAM W/VP: CPT | Mod: 26,S$GLB,, | Performed by: UROLOGY

## 2022-09-14 PROCEDURE — 51741 PR UROFLOWMETRY, COMPLEX: ICD-10-PCS | Mod: 26,51,S$GLB, | Performed by: UROLOGY

## 2022-09-14 PROCEDURE — 51728 PR COMPLEX CYSTOMETROGRAM VOIDING PRESSURE STUDIES: ICD-10-PCS | Mod: 26,S$GLB,, | Performed by: UROLOGY

## 2022-09-14 PROCEDURE — 52000 CYSTOURETHROSCOPY: CPT | Mod: 59,S$GLB,, | Performed by: UROLOGY

## 2022-09-14 PROCEDURE — 51784 PR ANAL/URINARY MUSCLE STUDY: ICD-10-PCS | Mod: 26,51,S$GLB, | Performed by: UROLOGY

## 2022-09-14 PROCEDURE — 51797 PR VOIDING PRESS STUDY INTRA-ABDOMINAL VOID: ICD-10-PCS | Mod: 26,S$GLB,, | Performed by: UROLOGY

## 2022-09-14 RX ORDER — DOXYCYCLINE HYCLATE 100 MG
100 TABLET ORAL ONCE
Status: COMPLETED | OUTPATIENT
Start: 2022-09-14 | End: 2022-09-14

## 2022-09-14 RX ORDER — LIDOCAINE HYDROCHLORIDE 20 MG/ML
JELLY TOPICAL ONCE
Status: COMPLETED | OUTPATIENT
Start: 2022-09-14 | End: 2022-09-14

## 2022-09-14 RX ADMIN — LIDOCAINE HYDROCHLORIDE: 20 JELLY TOPICAL at 02:09

## 2022-09-14 RX ADMIN — Medication 100 MG: at 02:09

## 2022-09-14 NOTE — PROCEDURES
Procedures    Urodynamic Report    Indication:  mixed incontinence    Patient was taken to the Urodynamic Suite with a comfortably full bladder and asked to perform a free uroflow.  Next, the patient was prepped and the urinary residual was drained with a 14 Fr catheter.  A 7 Fr dual lumen catheter was placed to measure intravesical pressures.  A 10 Fr balloon manometer was placed into the rectum for abdominal pressure measurements.  Patch EMG electrodes were placed on the perineum.  The patient was connected to the [employer] Urodynamic machine, using a multichannel technique, the data were interpreted.  The bladder was filled with sterile water at room temperature at a rate of 30 ml/min.  Patient is filled to urgency.  Filling is performed with the patient in the seated position.  Abdominal leak point pressures are checked at 1st desire, then serially at 50cc increments first with Valsalva then with coughing.  The patient was then asked to sit and void for a pressure flow study.    The following are the results of the study:  1.  Uroflow       Q max:  9.7 ml/sec       Voided volume:  43.5 ml       Pattern of the curve:  continuous    2.  PVR:  5 ml    3.  CMG       Sensation:         First Desire:  48.8 ml         Normal Desire:  73.2 ml         Strong Desire:  84.8 ml         Urgency:  315.1 ml       Capacity:  356.4 ml       Abnormal Contractions:  none       Compliance:  normal    4.  Abdominal Leak Point Pressure:       Valsalva:  27.6 cm H2O       Cough:  51.4 cm H2O    5.  EMG:  normal guarding, increased just before voiding    6.  Voiding phase  Patient states this was very atypical for her.  Normally, she has a strong stream and is done before others when she is in a public toilet       Q max:  11.1 ml/sec       P det at Q max:  26 cm H2O       Pattern of the curve:  prolonged, intermittent       Voided volume:  351.4 ml       PVR:  5 ml     7.  Analysis:  increased sensation, decreased capacity, stress  incontinence and empties well.      8.  Recommendations:       a.  She has a very low leak point pressure so recommended a retropubic sling.  She may still need to have a urethral bulking agent after       b.  Normally, I would recommend treating the urge component first, however, because her leak point pressure is so low, I doubt the treatments for the urge would be noticeably effective       c.  Discussed that she may still have urgency and frequency, urge incontinence after the sling.  She expressed understanding       D.  Consent signed      CYSTOSCOPY REPORT    Pre Procedure Diagnosis:  mixed incontinence    Post Procedure Diagnosis:  normal lower urinary tract    Anesthesia: 10 cc 2% lidocaine jelly applied per urethra.    14 FR Flexible Olympus cystoscope used.    FINDINGS:  Dome, anterior, posterior, lateral walls and bladder base free of urothelial abnormalities. Right and left ureteral orifices in the normal postion and configuration, both effluxed clear urine.  Bladder neck and urethra were normal.    Specimen:  none    The patient was taken to the cystoscopy suite and placed in dorsal lithotomy position.  The genitalia was prepped and draped  in the usual sterile fashion.  Two percent lidocaine jelly was inserted in the urethra.  After sufficent time had passed to allow good local anesthesia, the cystoscope was inserted in the urethra and passed into the bladder visualizing the urethra along its entire course.  The dome, anterior, posterior and lateral walls were examined systematically.  The ureteral orifices were in their usual position and configuration.  The cystoscope was turned upon itself 180 degrees to visualize the bladder neck.  The cystoscope was then brought to the level of the bladder neck, the water was turned on and the urethra was visualized.  The cystoscope was removed and the patient was instructed to urinate prior to leaving the office.     Post procedure medication:  doxycycline 100  mg x 1     ASSESSMENT/PLAN:  51 year old woman status post flexible cystoscopy.  1. Push fluids for 24 hours.  2. May see blood in the urine, this should gradually improve over the next 2-3 days.  3. The patient was instructed to return to the office or go to the emergency should fever, chills, cloudy urine, or inability to urinate develop.  4. Follow up as above.

## 2022-09-14 NOTE — PATIENT INSTRUCTIONS
_                                                                                                                                                                                             If any problems after hours or weekends, you may call [phone] and ask for the urology resident on call. SIMPLE URODYNAMIC STUDY (SUDS) & CYSTOSCOPY  UROLOGY CLINIC DISCHARGE INSTRUCTIONS    You have had a procedure that will require time to properly heal. Follow the instructions you have been given on how to care for yourself once you are home. Below is additional information to help in your recovery.    ACTIVITY  There are no restrictions in activity. Start doing again the things you did before the procedure.  You may experience a slight burning sensation. You may notice a small amount of blood in your urine. This will clear up within a day. Call the clinic if this continues beyond 48 hours.    DIET  Continue your normal diet. You may eat the same foods you ate before your procedure.  Drink plenty of fluids during the first 24-48 hours following your procedure.    MEDICATIONS  Resume all other previous medications from your prescribing physician.  Continue any pre=procedure antibiotics until they are all gone.    SIGNS AND SYMPTOMS TO REPORT TO THE DOCTOR  Chills or fever greater than 101° F within 24 hours of procedure.  Changes in urination, such as increased bleeding, foul smell, cloudy urine, or painful urination.  Call your doctor with any questions or concerns.    For any emergency situation, call 911 immediately or go to your nearest emergency room.    [facility]  [phone]

## 2022-09-15 ENCOUNTER — PATIENT MESSAGE (OUTPATIENT)
Dept: UROLOGY | Facility: CLINIC | Age: 52
End: 2022-09-15
Payer: COMMERCIAL

## 2022-09-16 ENCOUNTER — TELEPHONE (OUTPATIENT)
Dept: UROLOGY | Facility: CLINIC | Age: 52
End: 2022-09-16
Payer: COMMERCIAL

## 2022-09-16 DIAGNOSIS — N39.46 MIXED STRESS AND URGE URINARY INCONTINENCE: Primary | ICD-10-CM

## 2022-10-24 ENCOUNTER — TELEPHONE (OUTPATIENT)
Dept: UROLOGY | Facility: CLINIC | Age: 52
End: 2022-10-24
Payer: COMMERCIAL

## 2022-10-24 ENCOUNTER — ANESTHESIA EVENT (OUTPATIENT)
Dept: SURGERY | Facility: HOSPITAL | Age: 52
End: 2022-10-24
Payer: COMMERCIAL

## 2022-10-24 PROBLEM — N39.46 MIXED INCONTINENCE URGE AND STRESS: Status: ACTIVE | Noted: 2022-10-24

## 2022-10-24 NOTE — TELEPHONE ENCOUNTER
Called pt to confirm arrival time of 5am for procedure on 10/25/22. Gave pt NPO instructions and gave pt opportunity to ask questions. Pt verbalized understanding.

## 2022-10-24 NOTE — ANESTHESIA PREPROCEDURE EVALUATION
[facility]  Anesthesia Pre-Operative Evaluation         Patient Name/: Ruth Ann Dee, 1970  MRN: [id number]    SUBJECTIVE:     Pre-operative evaluation for Procedure(s) (LRB):  SLING, RETROPUBIC WITH SPARC (N/A)  CYSTOSCOPY (N/A)     10/24/2022    Ruth Ann Dee is a 52 y.o. female w/ a significant PMHx of MVP, and anxiety. She has been dealing with incontinence issues, and now plans for retropubic sling.     Patient now presents for the above procedure(s).    Prev airway: None documented.    Patient Active Problem List   Diagnosis    MVP With Palpitations    Mildly Elevated Diastolic BP 2015    Bilateral Hand Osteoarthritis    Generalized Arthralgias    Family Hx Of Rheumatoid Arthritis    Family Hx Of Thyroid Disorder    Family Hx Of Breast Cancer    Family Hx Of SLE    Chronic Idiopathic Thrombocytosis    Palpitations    Anxiety    Fibrocystic Breast Changes    H/O Palpitations    Internal Hemorrhoids    Ascending Colon Diverticulosis    Alopecia    Abnormal Thyroid Function Tests    OAB (overactive bladder)    Acute cholecystitis    Precordial pain       Review of patient's allergies indicates:  No Known Allergies    Current Inpatient Medications:       No current facility-administered medications on file prior to encounter.     Current Outpatient Medications on File Prior to Encounter   Medication Sig Dispense Refill    ascorbic acid/zinc (ZINC WITH VITAMIN C ORAL) Take 3 tablets by mouth once daily.      aspirin (ECOTRIN) 81 MG EC tablet Take 81 mg by mouth once daily.      MULTIVITAMIN ORAL Take 1 tablet by mouth once daily.      progesterone (PROMETRIUM) 200 MG capsule Take by mouth.      vitamin D (VITAMIN D3) 1000 units Tab Take 1,000 Units by mouth once daily.         Past Surgical History:   Procedure Laterality Date    breast lift      BREAST SURGERY      BUNIONECTOMY      COLONOSCOPY  2018    Dr. London    LAPAROSCOPIC  CHOLECYSTECTOMY N/A 2/26/2022    Procedure: CHOLECYSTECTOMY, LAPAROSCOPIC;  Surgeon: Rosie Alejandra MD;  Location: [facility] OR;  Service: General;  Laterality: N/A;    TUBAL LIGATION         Social History:  Tobacco Use: Low Risk     Smoking Tobacco Use: Never    Smokeless Tobacco Use: Never    Passive Exposure: Not on file       Alcohol Use: Not on file       OBJECTIVE:     Vital Signs Range:  BMI Readings from Last 1 Encounters:   09/14/22 24.96 kg/m²               Significant Labs:        Component Value Date/Time    WBC 13.92 (H) 02/26/2022 1013    HGB 14.9 02/26/2022 1013    HCT 44.1 02/26/2022 1013     (H) 02/26/2022 1013     02/26/2022 1013     10/20/2015 0926    K 4.3 02/26/2022 1013    K 4.6 10/20/2015 0926     02/26/2022 1013     10/20/2015 0926    CO2 30 02/26/2022 1013     02/26/2022 1013    BUN 12 02/26/2022 1013    CREATININE 0.60 02/26/2022 1013    CREATININE 0.72 10/20/2015 0926    MG 1.7 02/16/2022 1018    PHOS 4.2 11/13/2020 1148    CALCIUM 9.6 02/26/2022 1013    ALBUMIN 4.6 02/26/2022 1013    ALBUMIN 4.0 10/20/2015 0926    PROT 8.3 02/26/2022 1013    ALKPHOS 54 02/26/2022 1013    BILITOT 0.5 02/26/2022 1013    AST 31 02/26/2022 1013    AST 21 12/11/2015 1156    ALT 24 02/26/2022 1013        Please see Results Review for additional labs.     Diagnostic Studies: No relevant studies.    EKG:   Results for orders placed or performed during the hospital encounter of 02/26/22   EKG 12-lead    Collection Time: 02/26/22 10:14 AM    Narrative    Test Reason : R07.9,    Vent. Rate : 085 BPM     Atrial Rate : 085 BPM     P-R Int : 136 ms          QRS Dur : 082 ms      QT Int : 356 ms       P-R-T Axes : 083 078 055 degrees     QTc Int : 423 ms    Normal sinus rhythm  Normal ECG  No previous ECGs available  Confirmed by Tobi RICHTER, Michael KNAPP ([id number]) on 2/26/2022 10:27:33 AM    Referred By:             Confirmed By:Michael Britton MD       ECHO:  No results found for  this or any previous visit.        ASSESSMENT/PLAN:   \  Pre-op Assessment    I have reviewed the Patient Summary Reports.    I have reviewed the NPO Status.   I have reviewed the Medications.     Review of Systems  Anesthesia Hx:  No problems with previous Anesthesia  Denies Family Hx of Anesthesia complications.   Denies Personal Hx of Anesthesia complications.   Social:  Non-Smoker    Hematology/Oncology:  Hematology Normal   Oncology Normal     Cardiovascular:  Cardiovascular Normal Exercise tolerance: good     Pulmonary:  Pulmonary Normal    Renal/:  Renal/ Normal     Musculoskeletal:   Arthritis     Neurological:  Neurology Normal    Endocrine:  Endocrine Normal    Psych:   anxiety          Physical Exam  General: Well nourished, Cooperative, Alert and Oriented    Airway:  Mallampati: II / I  Mouth Opening: Normal  TM Distance: Normal  Tongue: Normal  Neck ROM: Normal ROM    Dental:  Intact, Retainer        Anesthesia Plan  Type of Anesthesia, risks & benefits discussed:    Anesthesia Type: Gen ETT  Intra-op Monitoring Plan: Standard ASA Monitors  Post Op Pain Control Plan: multimodal analgesia and IV/PO Opioids PRN  Induction:  IV  Airway Plan: Direct, Post-Induction  Informed Consent: Informed consent signed with the Patient and all parties understand the risks and agree with anesthesia plan.  All questions answered.   ASA Score: 1  Day of Surgery Review of History & Physical: H&P Update referred to the surgeon/provider.    Ready For Surgery From Anesthesia Perspective.     .

## 2022-10-24 NOTE — H&P
Urology ([facility]) H&P  Staff: Samantha Cardona MD    CC: Mixed urinary incontinence    HPI:  Ruth Ann Dee is a 52 y.o. female with mixed urinary incontinence.    She states she has had mixed urinary incontinence for many years dating back to childhood.    S/p SUDS/cysto in 2022. SUDS demonstrated capacity to be 354 mL w/ early sensation. Normal compliance and no DO. ALPP 27.6. On voiding, Qmax 11.1, PdetQmax 26 (but patient states this was not representative of her normal voiding pattern). PVR 5 mL. The cystoscopy portion was unremarkable.    , vaginal deliveries, her largest child was 9 lb 2 oz, sexually active, no pain.  Bowels are normal.     Although it's listed in the chart, she does not take ASA 81.    ROS:  Neg except per HPI    Past Medical History:   Diagnosis Date    a H/O Palpitations     a Mitral Valve Prolapse Ruled Out     10/4/15 Echo (Dr. PERLA Olivas) = Entirely Normal    e Abnormal Thyroid Function Tests     Dr. Juliette Sandifer ([facility]); Repeat TFTs Were Entirely Normal; In 2020    e Family Hx Of Thyroid Disorder     10/20/15 TSH And FT4 = Normal    g Chronic Idiopathic Thrombocytosis     Dr. Kip Valenzuela's 11/20/15 OV Note Reviewed; He Ordered A Lab Work Up Ordered    j Ascending Colon Diverticulosis     Dr. Kyle London    j Internal Hemorrhoids     Dr. Kyle mclain Family Hx Of Breast Cancer     k Fibrocystic Breast Changes     l Bilateral Hand Osteoarthritis     10/20/15 RF, BEVERLEY, UA, ESR And CRP = Normal    l Family Hx Of Rheumatoid Arthritis     Her Mother  From This; 10/20/15 RF, BEVERLEY, UA, ESR And CRP = Normal    l Family Hx Of SLE     10/20/15 RF, BEVERLEY, UA, ESR And CRP = Normal    l Generalized Arthralgias C/W Fibromyalgia ###    10/20/15 RF, BEVERLEY, UA, ESR And CRP = Normal    o Alopecia     10/2/20 Referred To Dr. Lilli Drummond    Wellness Visit 10/02/2020        Past Surgical History:   Procedure Laterality Date    breast lift      BREAST SURGERY       BUNIONECTOMY      COLONOSCOPY  01/22/2018    Dr. London    LAPAROSCOPIC CHOLECYSTECTOMY N/A 2/26/2022    Procedure: CHOLECYSTECTOMY, LAPAROSCOPIC;  Surgeon: Rosie Alejandra MD;  Location: [facility];  Service: General;  Laterality: N/A;    TUBAL LIGATION         Social History     Socioeconomic History    Marital status:    Tobacco Use    Smoking status: Never    Smokeless tobacco: Never   Substance and Sexual Activity    Alcohol use: Yes     Comment: occaisionally       Family History   Problem Relation Age of Onset    Arthritis Mother     Cancer Mother     Hypertension Mother     Thyroid disease Mother     Osteoporosis Mother     Breast cancer Mother         50's    Thyroid nodules Mother     Hypertension Father     Pacemaker/defibrilator Father 76    Breast cancer Paternal Grandmother         age unknown    Breast cancer Paternal Cousin         age unknown    Raynaud syndrome Sister     Heart attack Cousin 47        (mom's side)     Heart attack Paternal Grandfather 82       Review of patient's allergies indicates:  No Known Allergies    No current facility-administered medications on file prior to encounter.     Current Outpatient Medications on File Prior to Encounter   Medication Sig Dispense Refill    aspirin (ECOTRIN) 81 MG EC tablet Take 81 mg by mouth once daily.      ascorbic acid/zinc (ZINC WITH VITAMIN C ORAL) Take 3 tablets by mouth once daily.      MULTIVITAMIN ORAL Take 1 tablet by mouth once daily.      progesterone (PROMETRIUM) 200 MG capsule Take 200 mg by mouth every evening.      vitamin D (VITAMIN D3) 1000 units Tab Take 1,000 Units by mouth once daily.         Anticoagulation:  Yes - ASA 81    Physical Exam:  General: No acute distress, well developed. AAOx3  Head: Normocephalic, Atraumatic  Eyes: Extra-occular movements intact, No discharge  Neck: supple, symmetrical, trachea midline  Lungs: normal respiratory effort, no respiratory distress, no wheezes  CV: regular rate, 2+  pulses  Abdomen: soft, non-tender, non-distended, no organomegaly  MSK: no edema, no deformities, normal ROM  Skin: skin color, texture, turgor normal.  Neurologic: no focal deficits, sensation intact    Labs:    Urine dipstick today - SG 1.015, pH 5, negative for nitrites, leukocytes, and blood.    Lab Results   Component Value Date    WBC 13.92 (H) 02/26/2022    HGB 14.9 02/26/2022    HCT 44.1 02/26/2022    MCV 94 02/26/2022     (H) 02/26/2022           BMP  Lab Results   Component Value Date     02/26/2022    K 4.3 02/26/2022     02/26/2022    CO2 30 02/26/2022    BUN 12 02/26/2022    CREATININE 0.60 02/26/2022    CALCIUM 9.6 02/26/2022    ANIONGAP 7 (L) 02/26/2022    ESTGFRAFRICA >60 02/26/2022    EGFRNONAA >60 02/26/2022       Imaging:  N/A    Assessment: Ruth Ann Dee is a 52 y.o. female with mixed urinary incontinence.    Plan:     1. To OR today for retropubic MUS and cystoscopy.  2. Consents signed   3. I have explained the risk, benefits, and alternatives of the procedure in detail. The patient voices understanding and all questions have been answered. The patient agrees to proceed as planned.      Leroy Coker MD    Patient seen in holding.  No changes in clinical condition.  Proceed with planned procedure.       [Fever] : fever [Chills] : chills [Fatigue] : fatigue [Recent Change In Weight] : ~T recent weight change [Cough] : cough [Negative] : Heme/Lymph [FreeTextEntry9] : myalgias

## 2022-10-24 NOTE — PRE-PROCEDURE INSTRUCTIONS
PreOp Instructions given:   - Verbal medication information (what to hold and what to take)   - NPO guidelines   - Arrival place directions given; time to be given the day before procedure by the   Surgeon's Office 0500 - [facility]  - Bathing with antibacterial soap   - Don't wear any jewelry or bring any valuables AM of surgery   - No makeup or moisturizer to face   - No perfume/cologne, powder, lotions or aftershave   Pt. verbalized understanding.   Pt denies any h/o Anesthesia/Sedation complications or side effects.

## 2022-10-24 NOTE — TELEPHONE ENCOUNTER
----- Message from Jame George MA sent at 10/24/2022 11:38 AM CDT -----  Contact: [phone]  Patient is calling for arrival time for procedure, please call and advise.

## 2022-10-25 ENCOUNTER — HOSPITAL ENCOUNTER (OUTPATIENT)
Facility: HOSPITAL | Age: 52
Discharge: HOME | End: 2022-10-26
Attending: UROLOGY | Admitting: UROLOGY
Payer: COMMERCIAL

## 2022-10-25 ENCOUNTER — ANESTHESIA (OUTPATIENT)
Dept: SURGERY | Facility: HOSPITAL | Age: 52
End: 2022-10-25
Payer: COMMERCIAL

## 2022-10-25 DIAGNOSIS — N39.46 MIXED INCONTINENCE URGE AND STRESS: Primary | ICD-10-CM

## 2022-10-25 LAB
ANION GAP SERPL CALC-SCNC: 4 MMOL/L (ref 8–16)
B-HCG UR QL: NEGATIVE
BASOPHILS # BLD AUTO: 0.02 K/UL (ref 0–0.2)
BASOPHILS NFR BLD: 0.2 % (ref 0–1.9)
BUN SERPL-MCNC: 8 MG/DL (ref 6–20)
CALCIUM SERPL-MCNC: 7.7 MG/DL (ref 8.7–10.5)
CHLORIDE SERPL-SCNC: 112 MMOL/L (ref 95–110)
CO2 SERPL-SCNC: 20 MMOL/L (ref 23–29)
CREAT SERPL-MCNC: 0.6 MG/DL (ref 0.5–1.4)
CTP QC/QA: YES
DIFFERENTIAL METHOD: ABNORMAL
EOSINOPHIL # BLD AUTO: 0.1 K/UL (ref 0–0.5)
EOSINOPHIL NFR BLD: 0.9 % (ref 0–8)
ERYTHROCYTE [DISTWIDTH] IN BLOOD BY AUTOMATED COUNT: 12.4 % (ref 11.5–14.5)
EST. GFR  (NO RACE VARIABLE): >60 ML/MIN/1.73 M^2
GLUCOSE SERPL-MCNC: 93 MG/DL (ref 70–110)
HCT VFR BLD AUTO: 38.2 % (ref 37–48.5)
HGB BLD-MCNC: 12.4 G/DL (ref 12–16)
IMM GRANULOCYTES # BLD AUTO: 0.04 K/UL (ref 0–0.04)
IMM GRANULOCYTES NFR BLD AUTO: 0.4 % (ref 0–0.5)
LYMPHOCYTES # BLD AUTO: 1 K/UL (ref 1–4.8)
LYMPHOCYTES NFR BLD: 10.5 % (ref 18–48)
MCH RBC QN AUTO: 31.7 PG (ref 27–31)
MCHC RBC AUTO-ENTMCNC: 32.5 G/DL (ref 32–36)
MCV RBC AUTO: 98 FL (ref 82–98)
MONOCYTES # BLD AUTO: 0.2 K/UL (ref 0.3–1)
MONOCYTES NFR BLD: 1.7 % (ref 4–15)
NEUTROPHILS # BLD AUTO: 7.9 K/UL (ref 1.8–7.7)
NEUTROPHILS NFR BLD: 86.3 % (ref 38–73)
NRBC BLD-RTO: 0 /100 WBC
PLATELET # BLD AUTO: 281 K/UL (ref 150–450)
PMV BLD AUTO: 10.4 FL (ref 9.2–12.9)
POTASSIUM SERPL-SCNC: 4 MMOL/L (ref 3.5–5.1)
RBC # BLD AUTO: 3.91 M/UL (ref 4–5.4)
SODIUM SERPL-SCNC: 136 MMOL/L (ref 136–145)
WBC # BLD AUTO: 9.15 K/UL (ref 3.9–12.7)

## 2022-10-25 PROCEDURE — 52005 CYSTO W/URTRL CATHJ: CPT | Mod: ,,, | Performed by: UROLOGY

## 2022-10-25 PROCEDURE — 71000033 HC RECOVERY, INTIAL HOUR: Performed by: UROLOGY

## 2022-10-25 PROCEDURE — 36000707: Performed by: UROLOGY

## 2022-10-25 PROCEDURE — 85025 COMPLETE CBC W/AUTO DIFF WBC: CPT | Performed by: STUDENT IN AN ORGANIZED HEALTH CARE EDUCATION/TRAINING PROGRAM

## 2022-10-25 PROCEDURE — 63600175 PHARM REV CODE 636 W HCPCS: Performed by: STUDENT IN AN ORGANIZED HEALTH CARE EDUCATION/TRAINING PROGRAM

## 2022-10-25 PROCEDURE — 71000016 HC POSTOP RECOV ADDL HR: Performed by: UROLOGY

## 2022-10-25 PROCEDURE — 74420 UROGRAPHY RTRGR +-KUB: CPT | Mod: 26,,, | Performed by: UROLOGY

## 2022-10-25 PROCEDURE — C1758 CATHETER, URETERAL: HCPCS | Performed by: UROLOGY

## 2022-10-25 PROCEDURE — D9220A PRA ANESTHESIA: Mod: ,,, | Performed by: STUDENT IN AN ORGANIZED HEALTH CARE EDUCATION/TRAINING PROGRAM

## 2022-10-25 PROCEDURE — D9220A PRA ANESTHESIA: ICD-10-PCS | Mod: ,,, | Performed by: STUDENT IN AN ORGANIZED HEALTH CARE EDUCATION/TRAINING PROGRAM

## 2022-10-25 PROCEDURE — 74420 PR  X-RAY RETROGRADE PYELOGRAM: ICD-10-PCS | Mod: 26,,, | Performed by: UROLOGY

## 2022-10-25 PROCEDURE — 63600175 PHARM REV CODE 636 W HCPCS

## 2022-10-25 PROCEDURE — 25000003 PHARM REV CODE 250: Performed by: STUDENT IN AN ORGANIZED HEALTH CARE EDUCATION/TRAINING PROGRAM

## 2022-10-25 PROCEDURE — 37000008 HC ANESTHESIA 1ST 15 MINUTES: Performed by: UROLOGY

## 2022-10-25 PROCEDURE — 94761 N-INVAS EAR/PLS OXIMETRY MLT: CPT

## 2022-10-25 PROCEDURE — 52005 PR CYSTOURETHROSCOPY,URETER CATHETER: ICD-10-PCS | Mod: ,,, | Performed by: UROLOGY

## 2022-10-25 PROCEDURE — 37000009 HC ANESTHESIA EA ADD 15 MINS: Performed by: UROLOGY

## 2022-10-25 PROCEDURE — 25000003 PHARM REV CODE 250: Performed by: UROLOGY

## 2022-10-25 PROCEDURE — 71000015 HC POSTOP RECOV 1ST HR: Performed by: UROLOGY

## 2022-10-25 PROCEDURE — 27201423 OPTIME MED/SURG SUP & DEVICES STERILE SUPPLY: Performed by: UROLOGY

## 2022-10-25 PROCEDURE — 25000003 PHARM REV CODE 250

## 2022-10-25 PROCEDURE — 80048 BASIC METABOLIC PNL TOTAL CA: CPT | Performed by: STUDENT IN AN ORGANIZED HEALTH CARE EDUCATION/TRAINING PROGRAM

## 2022-10-25 PROCEDURE — 36000706: Performed by: UROLOGY

## 2022-10-25 RX ORDER — PHENYLEPHRINE HCL IN 0.9% NACL 1 MG/10 ML
SYRINGE (ML) INTRAVENOUS
Status: DISCONTINUED | OUTPATIENT
Start: 2022-10-25 | End: 2022-10-25

## 2022-10-25 RX ORDER — NEOSTIGMINE METHYLSULFATE 0.5 MG/ML
INJECTION, SOLUTION INTRAVENOUS
Status: DISCONTINUED | OUTPATIENT
Start: 2022-10-25 | End: 2022-10-25

## 2022-10-25 RX ORDER — HALOPERIDOL 5 MG/ML
0.5 INJECTION INTRAMUSCULAR EVERY 10 MIN PRN
Status: DISCONTINUED | OUTPATIENT
Start: 2022-10-25 | End: 2022-10-25 | Stop reason: HOSPADM

## 2022-10-25 RX ORDER — BUPIVACAINE HYDROCHLORIDE 2.5 MG/ML
INJECTION, SOLUTION EPIDURAL; INFILTRATION; INTRACAUDAL
Status: DISCONTINUED | OUTPATIENT
Start: 2022-10-25 | End: 2022-10-25 | Stop reason: HOSPADM

## 2022-10-25 RX ORDER — CEFAZOLIN SODIUM/WATER 2 G/20 ML
2 SYRINGE (ML) INTRAVENOUS
Status: COMPLETED | OUTPATIENT
Start: 2022-10-25 | End: 2022-10-25

## 2022-10-25 RX ORDER — ACETAMINOPHEN 10 MG/ML
INJECTION, SOLUTION INTRAVENOUS
Status: DISCONTINUED | OUTPATIENT
Start: 2022-10-25 | End: 2022-10-25

## 2022-10-25 RX ORDER — ROCURONIUM BROMIDE 10 MG/ML
INJECTION, SOLUTION INTRAVENOUS
Status: DISCONTINUED | OUTPATIENT
Start: 2022-10-25 | End: 2022-10-25

## 2022-10-25 RX ORDER — SODIUM CHLORIDE 0.9 % (FLUSH) 0.9 %
10 SYRINGE (ML) INJECTION
Status: DISCONTINUED | OUTPATIENT
Start: 2022-10-25 | End: 2022-10-25 | Stop reason: HOSPADM

## 2022-10-25 RX ORDER — HYDROMORPHONE HYDROCHLORIDE 1 MG/ML
0.2 INJECTION, SOLUTION INTRAMUSCULAR; INTRAVENOUS; SUBCUTANEOUS EVERY 5 MIN PRN
Status: DISCONTINUED | OUTPATIENT
Start: 2022-10-25 | End: 2022-10-25 | Stop reason: HOSPADM

## 2022-10-25 RX ORDER — MIDAZOLAM HYDROCHLORIDE 1 MG/ML
INJECTION, SOLUTION INTRAMUSCULAR; INTRAVENOUS
Status: DISCONTINUED | OUTPATIENT
Start: 2022-10-25 | End: 2022-10-25

## 2022-10-25 RX ORDER — OXYBUTYNIN CHLORIDE 5 MG/1
5 TABLET ORAL 3 TIMES DAILY PRN
Status: DISCONTINUED | OUTPATIENT
Start: 2022-10-25 | End: 2022-10-26 | Stop reason: HOSPADM

## 2022-10-25 RX ORDER — FENTANYL CITRATE 50 UG/ML
INJECTION, SOLUTION INTRAMUSCULAR; INTRAVENOUS
Status: DISCONTINUED | OUTPATIENT
Start: 2022-10-25 | End: 2022-10-25

## 2022-10-25 RX ORDER — DEXAMETHASONE SODIUM PHOSPHATE 4 MG/ML
INJECTION, SOLUTION INTRA-ARTICULAR; INTRALESIONAL; INTRAMUSCULAR; INTRAVENOUS; SOFT TISSUE
Status: DISCONTINUED | OUTPATIENT
Start: 2022-10-25 | End: 2022-10-25

## 2022-10-25 RX ORDER — SODIUM CHLORIDE, SODIUM LACTATE, POTASSIUM CHLORIDE, CALCIUM CHLORIDE 600; 310; 30; 20 MG/100ML; MG/100ML; MG/100ML; MG/100ML
INJECTION, SOLUTION INTRAVENOUS CONTINUOUS
Status: DISCONTINUED | OUTPATIENT
Start: 2022-10-25 | End: 2022-10-26 | Stop reason: HOSPADM

## 2022-10-25 RX ORDER — TALC
6 POWDER (GRAM) TOPICAL NIGHTLY PRN
Status: DISCONTINUED | OUTPATIENT
Start: 2022-10-25 | End: 2022-10-26 | Stop reason: HOSPADM

## 2022-10-25 RX ORDER — POLYETHYLENE GLYCOL 3350 17 G/17G
17 POWDER, FOR SOLUTION ORAL DAILY
Status: DISCONTINUED | OUTPATIENT
Start: 2022-10-25 | End: 2022-10-26 | Stop reason: HOSPADM

## 2022-10-25 RX ORDER — OXYCODONE HYDROCHLORIDE 5 MG/1
5 TABLET ORAL EVERY 4 HOURS PRN
Status: DISCONTINUED | OUTPATIENT
Start: 2022-10-25 | End: 2022-10-26 | Stop reason: HOSPADM

## 2022-10-25 RX ORDER — LIDOCAINE HYDROCHLORIDE 20 MG/ML
INJECTION, SOLUTION EPIDURAL; INFILTRATION; INTRACAUDAL; PERINEURAL
Status: DISCONTINUED | OUTPATIENT
Start: 2022-10-25 | End: 2022-10-25

## 2022-10-25 RX ORDER — PROPOFOL 10 MG/ML
VIAL (ML) INTRAVENOUS
Status: DISCONTINUED | OUTPATIENT
Start: 2022-10-25 | End: 2022-10-25

## 2022-10-25 RX ORDER — LIDOCAINE HYDROCHLORIDE 10 MG/ML
INJECTION, SOLUTION EPIDURAL; INFILTRATION; INTRACAUDAL; PERINEURAL
Status: DISCONTINUED | OUTPATIENT
Start: 2022-10-25 | End: 2022-10-25 | Stop reason: HOSPADM

## 2022-10-25 RX ORDER — ACETAMINOPHEN 500 MG
1000 TABLET ORAL EVERY 6 HOURS
Status: DISCONTINUED | OUTPATIENT
Start: 2022-10-25 | End: 2022-10-26 | Stop reason: HOSPADM

## 2022-10-25 RX ORDER — OXYCODONE HYDROCHLORIDE 10 MG/1
10 TABLET ORAL EVERY 4 HOURS PRN
Status: DISCONTINUED | OUTPATIENT
Start: 2022-10-25 | End: 2022-10-26 | Stop reason: HOSPADM

## 2022-10-25 RX ORDER — ONDANSETRON 2 MG/ML
INJECTION INTRAMUSCULAR; INTRAVENOUS
Status: DISCONTINUED | OUTPATIENT
Start: 2022-10-25 | End: 2022-10-25

## 2022-10-25 RX ORDER — ONDANSETRON 4 MG/1
4 TABLET, ORALLY DISINTEGRATING ORAL EVERY 6 HOURS PRN
Status: DISCONTINUED | OUTPATIENT
Start: 2022-10-25 | End: 2022-10-26 | Stop reason: HOSPADM

## 2022-10-25 RX ADMIN — MIDAZOLAM 2 MG: 1 INJECTION INTRAMUSCULAR; INTRAVENOUS at 06:10

## 2022-10-25 RX ADMIN — PROPOFOL 30 MG: 10 INJECTION, EMULSION INTRAVENOUS at 08:10

## 2022-10-25 RX ADMIN — ONDANSETRON 4 MG: 2 INJECTION INTRAMUSCULAR; INTRAVENOUS at 08:10

## 2022-10-25 RX ADMIN — ROCURONIUM BROMIDE 10 MG: 50 INJECTION INTRAVENOUS at 07:10

## 2022-10-25 RX ADMIN — GLYCOPYRROLATE 0.2 MG: 0.2 INJECTION INTRAMUSCULAR; INTRAVENOUS at 08:10

## 2022-10-25 RX ADMIN — Medication 2 G: at 07:10

## 2022-10-25 RX ADMIN — DEXAMETHASONE SODIUM PHOSPHATE 8 MG: 4 INJECTION INTRA-ARTICULAR; INTRALESIONAL; INTRAMUSCULAR; INTRAVENOUS; SOFT TISSUE at 07:10

## 2022-10-25 RX ADMIN — Medication 100 MCG: at 07:10

## 2022-10-25 RX ADMIN — ROCURONIUM BROMIDE 10 MG: 50 INJECTION INTRAVENOUS at 08:10

## 2022-10-25 RX ADMIN — LIDOCAINE HYDROCHLORIDE 100 MG: 20 INJECTION, SOLUTION INTRAVENOUS at 07:10

## 2022-10-25 RX ADMIN — GLYCOPYRROLATE 0.2 MG: 0.2 INJECTION INTRAMUSCULAR; INTRAVENOUS at 07:10

## 2022-10-25 RX ADMIN — ROCURONIUM BROMIDE 40 MG: 50 INJECTION INTRAVENOUS at 07:10

## 2022-10-25 RX ADMIN — ACETAMINOPHEN 1000 MG: 10 INJECTION INTRAVENOUS at 08:10

## 2022-10-25 RX ADMIN — SODIUM CHLORIDE, SODIUM LACTATE, POTASSIUM CHLORIDE, AND CALCIUM CHLORIDE: .6; .31; .03; .02 INJECTION, SOLUTION INTRAVENOUS at 10:10

## 2022-10-25 RX ADMIN — PROPOFOL 140 MG: 10 INJECTION, EMULSION INTRAVENOUS at 07:10

## 2022-10-25 RX ADMIN — Medication 6 MG: at 09:10

## 2022-10-25 RX ADMIN — SODIUM CHLORIDE: 9 INJECTION, SOLUTION INTRAVENOUS at 06:10

## 2022-10-25 RX ADMIN — GLYCOPYRROLATE 0.6 MG: 0.2 INJECTION INTRAMUSCULAR; INTRAVENOUS at 08:10

## 2022-10-25 RX ADMIN — ACETAMINOPHEN 1000 MG: 500 TABLET ORAL at 11:10

## 2022-10-25 RX ADMIN — NEOSTIGMINE METHYLSULFATE 3 MG: 0.5 INJECTION, SOLUTION INTRAVENOUS at 08:10

## 2022-10-25 RX ADMIN — FENTANYL CITRATE 100 MCG: 0.05 INJECTION, SOLUTION INTRAMUSCULAR; INTRAVENOUS at 07:10

## 2022-10-25 NOTE — ANESTHESIA PROCEDURE NOTES
Intubation    Date/Time: 10/25/2022 7:06 AM  Performed by: Milton Blakely DO  Authorized by: Connor Cedillo MD     Intubation:     Induction:  Intravenous    Intubated:  Postinduction    Mask Ventilation:  Easy with oral airway    Attempts:  1    Attempted By:  Resident anesthesiologist    Method of Intubation:  Direct    Blade:  Emmett 3    Laryngeal View Grade: Grade I - full view of cords      Difficult Airway Encountered?: No      Complications:  None    Airway Device:  Oral endotracheal tube    Airway Device Size:  7.0    Style/Cuff Inflation:  Cuffed (inflated to minimal occlusive pressure)    Inflation Amount (mL):  7    Tube secured:  21    Secured at:  The lips    Placement Verified By:  Capnometry    Complicating Factors:  None    Findings Post-Intubation:  Atraumatic/condition of teeth unchanged and BS equal bilateral

## 2022-10-25 NOTE — TRANSFER OF CARE
"Anesthesia Transfer of Care Note    Patient: Ruth Ann Dee    Procedure(s) Performed: Procedure(s) (LRB):  SLING, RETROPUBIC WITH SPARC (N/A)  CYSTOSCOPY (N/A)    Patient location: PACU    Anesthesia Type: general    Transport from OR: Transported from OR on 6-10 L/min O2 by face mask with adequate spontaneous ventilation    Post pain: adequate analgesia    Post assessment: no apparent anesthetic complications and tolerated procedure well    Post vital signs: stable    Level of consciousness: awake and alert    Nausea/Vomiting: no nausea/vomiting    Complications: none    Transfer of care protocol was followed      Last vitals:   Visit Vitals  /86 (BP Location: Right arm, Patient Position: Lying)   Pulse 75   Temp 36.6 °C (97.8 °F) (Oral)   Resp 16   Ht 5' 5" (1.651 m)   Wt 68 kg (150 lb)   LMP 09/29/2021 (Approximate)   SpO2 100%   Breastfeeding No   BMI 24.96 kg/m²     "

## 2022-10-25 NOTE — PROGRESS NOTES
VSS. Patient denies pain No N&V. SCD's in place throughout duration in PACU. Transferred to the next Phase of Care.

## 2022-10-25 NOTE — OP NOTE
[facility]  Operative Note    Date: 10/25/2022    Pre-Op Diagnosis:  Mixed urinary incontinence    Patient Active Problem List    Diagnosis Date Noted    Mixed incontinence urge and stress 10/24/2022    Precordial pain     Acute cholecystitis 02/26/2022    OAB (overactive bladder) 11/13/2020    Fibrocystic Breast Changes     H/O Palpitations     Internal Hemorrhoids     Ascending Colon Diverticulosis     Alopecia     Abnormal Thyroid Function Tests     Palpitations 09/14/2020    Anxiety 09/14/2020    Chronic Idiopathic Thrombocytosis     MVP With Palpitations     Mildly Elevated Diastolic BP 09/2015     Bilateral Hand Osteoarthritis     Generalized Arthralgias     Family Hx Of Rheumatoid Arthritis     Family Hx Of Thyroid Disorder     Family Hx Of Breast Cancer     Family Hx Of SLE        Post-Op Diagnosis: same    Procedure(s) Performed:   1.  Aborted retropubic midurethral sling placement  2.  Cystoscopy   3.  Right ureteral catheterization    Specimen(s): none    Staff Surgeon: Samantha Cardona MD    Assistant Surgeon: Leroy Coker MD    Anesthesia: General endotracheal anesthesia    Indications: Ruth Ann Dee is a 52 y.o. female with mixed urinary incontinence. She presents for retropubic midurethral sling placement.    Findings:   Upon passage of right-sided trochar, there was a through-and-through injury of the mid urethra. Due to this, case was aborted.  Sluggish efflux from right ureteral orifice initially. Ureteral catheter passed through to the approximate mid ureter without resistance. Following catheter removal, there was good efflux.    Estimated Blood Loss: 30 mL    Drains: 16 Fr vu catheter    Procedure in detail:  After the risks and benefits of the procedure were explained, consent was obtained.  Her urinalysis was confirmed to be negative for infection.  The patient was then taken to the operating room and placed under anesthesia. Pre-operative antibiotics were  administered. She was placed in the dorsal lithotomy position and prepped and draped in a normal and sterile fashion.     A 16 Kiswahili vu catheter was placed in a sterile fashion on the field and 10 cc of water was placed in the balloon. The mid-urethra was identified and an allis clamp was placed on the vaginal epithelium just distal to this.  The overlying vaginal epithelium was infiltrated with 1% plain lidocaine and 0.25% marcaine for hydrodissection.  A 1.5 cm midline incision was made on the vaginal epithelium using a 15 blade over the midurethra. The plane between the vaginal epithelium and chloe-urethral fascia was developed to each side of the urethra bilaterally to the endopelvic fascia.  Two abdominal incisions about 0.5 cm in length were made on both sides of the midline just superior to the pubic symphsis. The bladder was then emptied, leaving the vu catheter in place. The left trocar was then advanced through the rectus fascia as the handle was moved cephalad. The trocar was passed just behind the pubic bone through the retropubic space with the trocar exiting the vaginal incision.  After it was confirmed that there was no perforation of the vaginal epithelium, this same step was repeated on the contralateral side.     The vu was removed. A 22 Fr cystoscope with a 30 degree lens was then used to evaluate the urethra and bladder. It was confirmed there was no perforation of the urethra or bladder on the left. There was perforation on the right lateral bladder wall close to the bladder neck so the right-sided trochar was removed.  Brisk efflux could be seen exiting the left ureteral orifice but the efflux from the right was sluggish. The cystoscope was removed and the vu was then replaced.    The right-sided trochar was then re-advanced through the rectus fascia as the handle was moved cephalad. The trocar was passed just behind the pubic bone through the retropubic space with the trocar  exiting the vaginal incision without perforation of the vaginal epithelium. There was some resistance of the trocar and it slipped.  The Pedersen was removed and the cystoscope was inserted and we could see that the right-sided trochar had perforated the mid urethra in a through-and-through manner. This trochar was removed. Given this, we opted to abort the procedure. Due to the previous slow efflux from the right ureteral orifice, we advanced a 5 Fr open-ended ureteral catheter via the right UO and this easily advanced to approximately the mid ureter. The 5 Fr was removed and good efflux was seen from the right UO following this. The scope was removed and a new 16 Fr Pedersen catheter was placed with 10 mL of sterile water in the balloon.    After irrigating the vaginal incision and achieving hemostasis, the vaginal incision was closed in an interrupted vertical mattress fashion using 2-0 vicryl.  The vagina was packed with saline-soaked Kerlix with K-Y Jelly.  The stab incisions were closed with Dermabond.      Plan: The patient will be admitted to the urology service for further monitoring. We will remove her vaginal packing in the morning. She will be discharged with her Pedersen in place.    Leroy Coker MD    I was present for the entire case and agree with the above note.  Passage of the trocar on the right was performed by me.

## 2022-10-25 NOTE — PLAN OF CARE
[facility] - [facility]  Discharge Assessment    Primary Care Provider: Michael Garrido MD     Discharge Assessment (most recent)       BRIEF DISCHARGE ASSESSMENT - 10/25/22 7198          Discharge Planning    Assessment Type Discharge Planning Brief Assessment     Resource/Environmental Concerns none     Support Systems Spouse/significant other     Equipment Currently Used at Home none     Current Living Arrangements home/apartment/condo     Patient/Family Anticipates Transition to home with family     Patient/Family Anticipated Services at Transition none     DME Needed Upon Discharge  none     Discharge Plan A Home with family     Discharge Plan B Home with family                     Spoke with patient and family at bedside to complete d/c planning assessment. Patient lives with spouse and adult son. Home is single story with no steps to enter. Independent without DME. Verified PCP, Pharmacy and health insurance. Will have help at home from patient at discharge. Will continue to follow.

## 2022-10-25 NOTE — ANESTHESIA POSTPROCEDURE EVALUATION
Anesthesia Post Evaluation    Patient: Ruth Ann Dee    Procedure(s) Performed: Procedure(s) (LRB):  SLING, RETROPUBIC WITH SPARC (N/A)  CYSTOSCOPY (N/A)    Final Anesthesia Type: general      Patient location during evaluation: PACU  Patient participation: Yes- Able to Participate  Level of consciousness: awake and alert  Post-procedure vital signs: reviewed and stable  Pain management: adequate  Airway patency: patent  ARIEL mitigation strategies: Multimodal analgesia  PONV status at discharge: No PONV  Anesthetic complications: no      Cardiovascular status: blood pressure returned to baseline and hemodynamically stable  Respiratory status: unassisted  Hydration status: euvolemic  Follow-up not needed.          Vitals Value Taken Time   /78 10/25/22 1117   Temp 36.3 °C (97.3 °F) 10/25/22 0935   Pulse 57 10/25/22 1129   Resp 14 10/25/22 1129   SpO2 100 % 10/25/22 1129   Vitals shown include unvalidated device data.      No case tracking events are documented in the log.      Pain/Pal Score: Pal Score: 10 (10/25/2022 11:15 AM)

## 2022-10-26 ENCOUNTER — TELEPHONE (OUTPATIENT)
Dept: UROLOGY | Facility: CLINIC | Age: 52
End: 2022-10-26
Payer: COMMERCIAL

## 2022-10-26 VITALS
RESPIRATION RATE: 18 BRPM | BODY MASS INDEX: 24.98 KG/M2 | SYSTOLIC BLOOD PRESSURE: 125 MMHG | WEIGHT: 149.94 LBS | HEART RATE: 70 BPM | TEMPERATURE: 98 F | HEIGHT: 65 IN | OXYGEN SATURATION: 100 % | DIASTOLIC BLOOD PRESSURE: 76 MMHG

## 2022-10-26 LAB
ANION GAP SERPL CALC-SCNC: 5 MMOL/L (ref 8–16)
ANION GAP SERPL CALC-SCNC: 5 MMOL/L (ref 8–16)
BASOPHILS # BLD AUTO: 0.03 K/UL (ref 0–0.2)
BASOPHILS # BLD AUTO: 0.03 K/UL (ref 0–0.2)
BASOPHILS NFR BLD: 0.2 % (ref 0–1.9)
BASOPHILS NFR BLD: 0.2 % (ref 0–1.9)
BUN SERPL-MCNC: 9 MG/DL (ref 6–20)
BUN SERPL-MCNC: 9 MG/DL (ref 6–20)
CALCIUM SERPL-MCNC: 8.5 MG/DL (ref 8.7–10.5)
CALCIUM SERPL-MCNC: 8.8 MG/DL (ref 8.7–10.5)
CHLORIDE SERPL-SCNC: 104 MMOL/L (ref 95–110)
CHLORIDE SERPL-SCNC: 105 MMOL/L (ref 95–110)
CO2 SERPL-SCNC: 26 MMOL/L (ref 23–29)
CO2 SERPL-SCNC: 26 MMOL/L (ref 23–29)
CREAT SERPL-MCNC: 0.7 MG/DL (ref 0.5–1.4)
CREAT SERPL-MCNC: 0.7 MG/DL (ref 0.5–1.4)
DIFFERENTIAL METHOD: ABNORMAL
DIFFERENTIAL METHOD: ABNORMAL
EOSINOPHIL # BLD AUTO: 0.2 K/UL (ref 0–0.5)
EOSINOPHIL # BLD AUTO: 0.2 K/UL (ref 0–0.5)
EOSINOPHIL NFR BLD: 1.2 % (ref 0–8)
EOSINOPHIL NFR BLD: 1.3 % (ref 0–8)
ERYTHROCYTE [DISTWIDTH] IN BLOOD BY AUTOMATED COUNT: 12.7 % (ref 11.5–14.5)
ERYTHROCYTE [DISTWIDTH] IN BLOOD BY AUTOMATED COUNT: 12.7 % (ref 11.5–14.5)
EST. GFR  (NO RACE VARIABLE): >60 ML/MIN/1.73 M^2
EST. GFR  (NO RACE VARIABLE): >60 ML/MIN/1.73 M^2
GLUCOSE SERPL-MCNC: 125 MG/DL (ref 70–110)
GLUCOSE SERPL-MCNC: 126 MG/DL (ref 70–110)
HCT VFR BLD AUTO: 32.9 % (ref 37–48.5)
HCT VFR BLD AUTO: 33.1 % (ref 37–48.5)
HGB BLD-MCNC: 11.3 G/DL (ref 12–16)
HGB BLD-MCNC: 11.3 G/DL (ref 12–16)
IMM GRANULOCYTES # BLD AUTO: 0.05 K/UL (ref 0–0.04)
IMM GRANULOCYTES # BLD AUTO: 0.07 K/UL (ref 0–0.04)
IMM GRANULOCYTES NFR BLD AUTO: 0.3 % (ref 0–0.5)
IMM GRANULOCYTES NFR BLD AUTO: 0.4 % (ref 0–0.5)
LYMPHOCYTES # BLD AUTO: 2.8 K/UL (ref 1–4.8)
LYMPHOCYTES # BLD AUTO: 2.8 K/UL (ref 1–4.8)
LYMPHOCYTES NFR BLD: 17.5 % (ref 18–48)
LYMPHOCYTES NFR BLD: 17.7 % (ref 18–48)
MCH RBC QN AUTO: 31.9 PG (ref 27–31)
MCH RBC QN AUTO: 32.6 PG (ref 27–31)
MCHC RBC AUTO-ENTMCNC: 34.1 G/DL (ref 32–36)
MCHC RBC AUTO-ENTMCNC: 34.3 G/DL (ref 32–36)
MCV RBC AUTO: 93 FL (ref 82–98)
MCV RBC AUTO: 95 FL (ref 82–98)
MONOCYTES # BLD AUTO: 1.3 K/UL (ref 0.3–1)
MONOCYTES # BLD AUTO: 1.3 K/UL (ref 0.3–1)
MONOCYTES NFR BLD: 7.7 % (ref 4–15)
MONOCYTES NFR BLD: 7.9 % (ref 4–15)
NEUTROPHILS # BLD AUTO: 11.6 K/UL (ref 1.8–7.7)
NEUTROPHILS # BLD AUTO: 11.8 K/UL (ref 1.8–7.7)
NEUTROPHILS NFR BLD: 72.5 % (ref 38–73)
NEUTROPHILS NFR BLD: 73.1 % (ref 38–73)
NRBC BLD-RTO: 0 /100 WBC
NRBC BLD-RTO: 0 /100 WBC
PLATELET # BLD AUTO: 295 K/UL (ref 150–450)
PLATELET # BLD AUTO: 327 K/UL (ref 150–450)
PMV BLD AUTO: 10.4 FL (ref 9.2–12.9)
PMV BLD AUTO: 10.5 FL (ref 9.2–12.9)
POTASSIUM SERPL-SCNC: 3.7 MMOL/L (ref 3.5–5.1)
POTASSIUM SERPL-SCNC: 3.7 MMOL/L (ref 3.5–5.1)
RBC # BLD AUTO: 3.47 M/UL (ref 4–5.4)
RBC # BLD AUTO: 3.54 M/UL (ref 4–5.4)
SODIUM SERPL-SCNC: 135 MMOL/L (ref 136–145)
SODIUM SERPL-SCNC: 136 MMOL/L (ref 136–145)
WBC # BLD AUTO: 15.97 K/UL (ref 3.9–12.7)
WBC # BLD AUTO: 16.13 K/UL (ref 3.9–12.7)

## 2022-10-26 PROCEDURE — 63600175 PHARM REV CODE 636 W HCPCS: Performed by: STUDENT IN AN ORGANIZED HEALTH CARE EDUCATION/TRAINING PROGRAM

## 2022-10-26 PROCEDURE — 80048 BASIC METABOLIC PNL TOTAL CA: CPT | Performed by: STUDENT IN AN ORGANIZED HEALTH CARE EDUCATION/TRAINING PROGRAM

## 2022-10-26 PROCEDURE — 25000003 PHARM REV CODE 250: Performed by: STUDENT IN AN ORGANIZED HEALTH CARE EDUCATION/TRAINING PROGRAM

## 2022-10-26 PROCEDURE — 85025 COMPLETE CBC W/AUTO DIFF WBC: CPT | Mod: 91 | Performed by: STUDENT IN AN ORGANIZED HEALTH CARE EDUCATION/TRAINING PROGRAM

## 2022-10-26 PROCEDURE — 36415 COLL VENOUS BLD VENIPUNCTURE: CPT | Performed by: STUDENT IN AN ORGANIZED HEALTH CARE EDUCATION/TRAINING PROGRAM

## 2022-10-26 RX ORDER — OXYCODONE HYDROCHLORIDE 5 MG/1
5 TABLET ORAL EVERY 4 HOURS PRN
Qty: 10 TABLET | Refills: 0 | Status: SHIPPED | OUTPATIENT
Start: 2022-10-26 | End: 2022-11-23

## 2022-10-26 RX ORDER — SULFAMETHOXAZOLE AND TRIMETHOPRIM 800; 160 MG/1; MG/1
1 TABLET ORAL DAILY
Qty: 3 TABLET | Refills: 0 | Status: SHIPPED | OUTPATIENT
Start: 2022-10-26 | End: 2022-10-29

## 2022-10-26 RX ORDER — POLYETHYLENE GLYCOL 3350 17 G/17G
17 POWDER, FOR SOLUTION ORAL DAILY
Qty: 238 G | Refills: 0 | Status: SHIPPED | OUTPATIENT
Start: 2022-10-26 | End: 2022-11-09

## 2022-10-26 RX ADMIN — POLYETHYLENE GLYCOL 3350 17 G: 17 POWDER, FOR SOLUTION ORAL at 08:10

## 2022-10-26 RX ADMIN — SODIUM CHLORIDE, SODIUM LACTATE, POTASSIUM CHLORIDE, AND CALCIUM CHLORIDE: .6; .31; .03; .02 INJECTION, SOLUTION INTRAVENOUS at 05:10

## 2022-10-26 NOTE — TELEPHONE ENCOUNTER
----- Message from Wil Harper MD sent at 10/26/2022  9:10 AM CDT -----  Regarding: po fu  Please schedule Ruth Ann Dee for a 2 week VCUG with Dr. Cardona in [place]. She will NOT be doing a RUG        Thank you,  NM

## 2022-10-26 NOTE — ASSESSMENT & PLAN NOTE
Ruth Ann Dee is a 51yo female with mixed urinary incontinence who underwent aborted retropubic midurethral sling placement, cystoscopy and right ureteral catheterization yesterday.    -Ambulate QID.  -Will set up RUG in 2 weeks   -Discharge today with vu, antibiotics and pain medication

## 2022-10-26 NOTE — PROGRESS NOTES
[facility]  Urology  Progress Note    Patient Name: Ruth Ann Dee  MRN: [id number]  Admission Date: 10/25/2022  Hospital Length of Stay: 0 days  Code Status: Full Code   Attending Provider: Samantha Cardona MD   Primary Care Physician: Michael Garrido MD    Subjective:     HPI:   Ruth Ann Dee is a 52 y.o. female with mixed urinary incontinence.     She states she has had mixed urinary incontinence for many years dating back to childhood.     S/p SUDS/cysto in 2022. SUDS demonstrated capacity to be 354 mL w/ early sensation. Normal compliance and no DO. ALPP 27.6. On voiding, Qmax 11.1, PdetQmax 26 (but patient states this was not representative of her normal voiding pattern). PVR 5 mL. The cystoscopy portion was unremarkable.     , vaginal deliveries, her largest child was 9 lb 2 oz, sexually active, no pain.  Bowels are normal.      Although it's listed in the chart, she does not take ASA 81.      Interval History: S/p aborted retropubic midurethral sling placement, cystoscopy and right ureteral catheterization yesterday. Tolerating diet. Pain controlled.       Objective:     Temp:  [97.3 °F (36.3 °C)-98.6 °F (37 °C)] 98 °F (36.7 °C)  Pulse:  [51-71] 70  Resp:  [10-20] 18  SpO2:  [95 %-100 %] 100 %  BP: (101-143)/(58-85) 125/76     Body mass index is 24.95 kg/m².           Drains       Drain  Duration                  Urethral Catheter 10/25/22 0728 Silicone 16 Fr. 1 day                    Physical Exam  Vitals reviewed.   Constitutional:       General: She is not in acute distress.     Appearance: She is well-developed.   HENT:      Head: Normocephalic and atraumatic.   Cardiovascular:      Rate and Rhythm: Normal rate.   Pulmonary:      Effort: Pulmonary effort is normal. No respiratory distress.      Breath sounds: No stridor.   Abdominal:      General: There is no distension.      Palpations: Abdomen is soft.      Tenderness: There is no abdominal tenderness.   Genitourinary:      Comments: Vu draining clear yellow   Musculoskeletal:         General: Normal range of motion.      Cervical back: Normal range of motion.   Skin:     General: Skin is warm and dry.   Neurological:      Mental Status: She is alert.   Psychiatric:         Behavior: Behavior normal.       Significant Labs:    BMP:  Recent Labs   Lab 10/25/22  1047 10/26/22  0550    136  135*   K 4.0 3.7  3.7   * 105  104   CO2 20* 26  26   BUN 8 9  9   CREATININE 0.6 0.7  0.7   CALCIUM 7.7* 8.8  8.5*       CBC:   Recent Labs   Lab 10/25/22  1047 10/26/22  0550   WBC 9.15 15.97*  16.13*   HGB 12.4 11.3*  11.3*   HCT 38.2 32.9*  33.1*    327  295       All pertinent labs results from the past 24 hours have been reviewed.    Significant Imaging:  All pertinent imaging results/findings from the past 24 hours have been reviewed.                    Assessment/Plan:     * Mixed incontinence urge and stress  Ruth Ann Dee is a 51yo female with mixed urinary incontinence who underwent aborted retropubic midurethral sling placement, cystoscopy and right ureteral catheterization yesterday.    -Ambulate QID.  -Will set up RUG in 2 weeks   -Discharge today with vu, antibiotics and pain medication        VTE Risk Mitigation (From admission, onward)         Ordered     IP VTE HIGH RISK PATIENT  Once         10/25/22 0948     Place sequential compression device  Until discontinued         10/25/22 0948     Place MIL hose  Until discontinued         10/25/22 0948                DOUG WHITMAN MD  Urology  [facility] - [facility]

## 2022-10-26 NOTE — SUBJECTIVE & OBJECTIVE
Interval History: S/p aborted retropubic midurethral sling placement, cystoscopy and right ureteral catheterization yesterday. Tolerating diet. Pain controlled.       Objective:     Temp:  [97.3 °F (36.3 °C)-98.6 °F (37 °C)] 98 °F (36.7 °C)  Pulse:  [51-71] 70  Resp:  [10-20] 18  SpO2:  [95 %-100 %] 100 %  BP: (101-143)/(58-85) 125/76     Body mass index is 24.95 kg/m².           Drains       Drain  Duration                  Urethral Catheter 10/25/22 0728 Silicone 16 Fr. 1 day                    Physical Exam  Vitals reviewed.   Constitutional:       General: She is not in acute distress.     Appearance: She is well-developed.   HENT:      Head: Normocephalic and atraumatic.   Cardiovascular:      Rate and Rhythm: Normal rate.   Pulmonary:      Effort: Pulmonary effort is normal. No respiratory distress.      Breath sounds: No stridor.   Abdominal:      General: There is no distension.      Palpations: Abdomen is soft.      Tenderness: There is no abdominal tenderness.   Genitourinary:     Comments: Pedersen draining clear yellow   Musculoskeletal:         General: Normal range of motion.      Cervical back: Normal range of motion.   Skin:     General: Skin is warm and dry.   Neurological:      Mental Status: She is alert.   Psychiatric:         Behavior: Behavior normal.       Significant Labs:    BMP:  Recent Labs   Lab 10/25/22  1047 10/26/22  0550    136  135*   K 4.0 3.7  3.7   * 105  104   CO2 20* 26  26   BUN 8 9  9   CREATININE 0.6 0.7  0.7   CALCIUM 7.7* 8.8  8.5*       CBC:   Recent Labs   Lab 10/25/22  1047 10/26/22  0550   WBC 9.15 15.97*  16.13*   HGB 12.4 11.3*  11.3*   HCT 38.2 32.9*  33.1*    327  295       All pertinent labs results from the past 24 hours have been reviewed.    Significant Imaging:  All pertinent imaging results/findings from the past 24 hours have been reviewed.

## 2022-10-26 NOTE — TELEPHONE ENCOUNTER
----- Message from Jade Ramirez RN sent at 10/26/2022  3:11 PM CDT -----  Patient discharged home today without a post-op appt. Please arrange this. Thanks Lorraine [phone]

## 2022-10-26 NOTE — DISCHARGE SUMMARY
[facility]  Urology  Discharge Summary      Patient Name: Ruth Ann Dee  MRN: [id number]  Admission Date: 10/25/2022  Hospital Length of Stay: 0 days  Discharge Date and Time:  10/26/2022 9:03 AM  Attending Physician: Samantha Cardona MD   Discharging Provider: DOUG WHITMAN MD  Primary Care Physician: Michael Garrido MD    HPI:    Ruth Ann Dee is a 52 y.o. female with mixed urinary incontinence.     She states she has had mixed urinary incontinence for many years dating back to childhood.     S/p SUDS/cysto in 2022. SUDS demonstrated capacity to be 354 mL w/ early sensation. Normal compliance and no DO. ALPP 27.6. On voiding, Qmax 11.1, PdetQmax 26 (but patient states this was not representative of her normal voiding pattern). PVR 5 mL. The cystoscopy portion was unremarkable.     , vaginal deliveries, her largest child was 9 lb 2 oz, sexually active, no pain.  Bowels are normal.      Although it's listed in the chart, she does not take ASA 81.      Procedure(s) (LRB):  SLING, RETROPUBIC WITH SPARC (N/A)  CYSTOSCOPY (N/A)     Indwelling Lines/Drains at time of discharge:   Lines/Drains/Airways       Drain  Duration                  Urethral Catheter 10/25/22 0728 Silicone 16 Fr. 1 day                    Hospital Course (synopsis of major diagnoses, care, treatment, and services provided during the course of the hospital stay): [facility] and underwent above procedure. Planned procedure was aborted due to urethral injury and vu catheter was placed. Tolerated the procedure well and was transferred to the floor after recovery from anesthesia. Please see the operative note for further procedure details. Vitals remained stable throughout the post operative period. Physical exam was appropriate for post operative state. The vu is to remain in place until outpatient follow up in 2 weeks. Diet was advanced, and the patient was able to tolerate a regular diet prior to discharge.  Patient was ambulating on POD1 without issues. Patient was deemed suitable for discharge on No discharge date for patient encounter.       Goals of Care Treatment Preferences:  Code Status: Full Code      Consults: none    Significant Diagnostic Studies:     Pending Diagnostic Studies:       None            Final Active Diagnoses:    Diagnosis Date Noted POA    PRINCIPAL PROBLEM:  Mixed incontinence urge and stress [N39.46] 10/24/2022 Yes    Fibrocystic Breast Changes [N60.19]  Yes    Anxiety [F41.9] 09/14/2020 Yes    MVP With Palpitations [I34.1]  Yes    Bilateral Hand Osteoarthritis [M19.041, M19.042]  Yes    Generalized Arthralgias [M25.50]  Yes      Problems Resolved During this Admission:         Discharged Condition: good    Disposition: Home or Self Care    Follow Up:    Patient Instructions:      Diet Adult Regular     Lifting restrictions     Notify your health care provider if you experience any of the following:  temperature >100.4     Notify your health care provider if you experience any of the following:  persistent nausea and vomiting or diarrhea     Notify your health care provider if you experience any of the following:  severe uncontrolled pain     Notify your health care provider if you experience any of the following:  redness, tenderness, or signs of infection (pain, swelling, redness, odor or green/yellow discharge around incision site)     Notify your health care provider if you experience any of the following:  difficulty breathing or increased cough     Notify your health care provider if you experience any of the following:  severe persistent headache     Notify your health care provider if you experience any of the following:  worsening rash     Notify your health care provider if you experience any of the following:  persistent dizziness, light-headedness, or visual disturbances     Notify your health care provider if you experience any of the following:  increased confusion or weakness      Notify your health care provider if you experience any of the following:     Activity as tolerated     Medications:  Reconciled Home Medications:      Medication List        START taking these medications      oxyCODONE 5 MG immediate release tablet  Commonly known as: ROXICODONE  Take 1 tablet (5 mg total) by mouth every 4 (four) hours as needed for Pain.     polyethylene glycol 17 gram Pwpk  Commonly known as: GLYCOLAX  Take 17 g by mouth once daily. for 14 days     sulfamethoxazole-trimethoprim 800-160mg 800-160 mg Tab  Commonly known as: BACTRIM DS  Take 1 tablet by mouth once daily. for 3 days            CONTINUE taking these medications      aspirin 81 MG EC tablet  Commonly known as: ECOTRIN  Take 81 mg by mouth once daily.     MULTIVITAMIN ORAL  Take 1 tablet by mouth once daily.     progesterone 200 MG capsule  Commonly known as: PROMETRIUM  Take 200 mg by mouth every evening.     vitamin D 1000 units Tab  Commonly known as: VITAMIN D3  Take 1,000 Units by mouth once daily.     ZINC WITH VITAMIN C ORAL  Take 3 tablets by mouth once daily.              Time spent on the discharge of patient: 15 minutes    DOUG WHITMAN MD  Urology  [facility] - Surgery    Patient seen on rounds.  Abdomen soft, she had no problems eating breakfast.  Vaginal packing out.  Vu care was taught, she was provided with a velcro fixation device as she has intolerance to adhesives.  Leg bag and vu bag given and instructions on how to use both discussed.  Plan to see her back in 2 weeks for pericatheter RUG.

## 2022-10-26 NOTE — HPI
Ruth Ann Dee is a 52 y.o. female with mixed urinary incontinence.     She states she has had mixed urinary incontinence for many years dating back to childhood.     S/p SUDS/cysto in 2022. SUDS demonstrated capacity to be 354 mL w/ early sensation. Normal compliance and no DO. ALPP 27.6. On voiding, Qmax 11.1, PdetQmax 26 (but patient states this was not representative of her normal voiding pattern). PVR 5 mL. The cystoscopy portion was unremarkable.     , vaginal deliveries, her largest child was 9 lb 2 oz, sexually active, no pain.  Bowels are normal.      Although it's listed in the chart, she does not take ASA 81.

## 2022-10-26 NOTE — DISCHARGE INSTRUCTIONS
Leave vu catheter in place until follow up in  2 weeks.     Post Op Instructions for Pelvic Organ Prolapse Surgery    RECOVERY ROOM  You will likely awaken with a vaginal packing in place.  This will feel like a large tampon.  If you have a catheter and feel the need to urinate, relax and let the urine flow.  If you do not have a catheter and feel the need to urinate, please let the recovery room nurse know so they can either assist you to the bathroom or provide you with a bedpan.  You may see blue or green urine after surgery.  This is from a dye that is given to you during surgery and will clear within 24 hours.      ACTIVITY  The first six weeks is a critical time period for wound healing.  We depend on the tissue scarring to provide the necessary vaginal support.    After receiving an anesthetic you may feel sleepy or nauseated.  It is best to have little activity for the first 24-48 hours.  Gradually increase activity.  No heavy lifting (nothing greater than 10 pounds or a gallon of milk) for 6 weeks.  Pelvic rest (no sexual intercourse, douching or tampons) for 6 weeks.  It is OK to walk around the house.  Avoid riding a bicycle or putting similar pressure over this area.  No strenuous workouts.    Do not drive for the first 48 hours or if you are taking narcotic pain medication.  Before driving, be sure you can press the brakes without difficulty or pain.    WOUND CARE INSTRUCTIONS  You may have small incisions that were closed with a medical grade superglue.  You may wash these and pat them dry after 24 hours  Do not immerse yourself in water, (i.e. no tub baths, swimming or hot tubbing) for 6 weeks.  You may shower.    It is normal to have some light bleeding which should gradually resolve over the next week.  This will look like a light period.    DIET  In the first 24 hours, it is common to experience some nausea, so take clear liquids.  Once the nausea has resolved, a soft diet is recommended with  gradual increase to your normal diet.    MEDICATIONS  Pain medication should be taken on an as needed basis.  If the narcotic is too much, over-the-counter pain relievers may be taken.  However, do not take additional Tylenol/acetaminophen while taking narcotic pain medication as this can lead to an overdose of the Tylenol/acetaminophen.  Antibiotics, if ordered, should be taken as directed until the prescription has been completed.    A stool softener (Colace or docusate sodium) is recommended to maintain soft stools after surgery.  If you do not have a bowel movement within 36-48 hours of surgery, take 2 tablespoons of Milk of Magnesia.  If there is still no bowel movement by the next day, take ½ bottle of Magnesium Citrate (refrigerate and drink with a straw.)    WHEN TO CALL THE DOCTOR:  For heavy bleeding (vaginal discharge like a light period is expected for the 3rd week)  Redness or swelling around the incision  Fever over 101oF (38.5oC)  Significant vaginal drainage   Persistent pain unrelieved by the pain medication  Leg swelling  Shortness of breath  Burning with urination  Inability to urinate  Abdominal pain not improving day by day      FOR EMERGENCIES  If any unusual problems, questions or concerns, please contact your physician or the resident on call at [phone] after hours or during office hours, [phone]

## 2022-10-26 NOTE — PLAN OF CARE
[facility]  Discharge Final Note    Primary Care Provider: Michael Garrido MD    Expected Discharge Date: 10/26/2022    Final Discharge Note (most recent)       Final Note - 10/26/22 1510          Final Note    Assessment Type Final Discharge Note     Anticipated Discharge Disposition Home or Self Care     What phone number can be called within the next 1-3 days to see how you are doing after discharge? --   [phone]    Hospital Resources/Appts/Education Provided Appointments scheduled and added to AVS                     Important Message from Medicare      Patient discharged home to care of family on 10/26/22.

## 2022-10-26 NOTE — NURSING
Pt ready for discharge. D/c instructions given to pt. Nurse and Dulce RICHTER instructed pt on catheter care. Verbalized understanding. No further questions asked. Prescriptions delivered to bedside. Removed PIV. Tolerated well. Awaiting pt escort.

## 2022-10-28 ENCOUNTER — TELEPHONE (OUTPATIENT)
Dept: UROLOGY | Facility: CLINIC | Age: 52
End: 2022-10-28
Payer: COMMERCIAL

## 2022-10-28 DIAGNOSIS — N39.46 MIXED STRESS AND URGE URINARY INCONTINENCE: Primary | ICD-10-CM

## 2022-10-31 ENCOUNTER — PATIENT MESSAGE (OUTPATIENT)
Dept: UROLOGY | Facility: CLINIC | Age: 52
End: 2022-10-31
Payer: COMMERCIAL

## 2022-11-01 NOTE — TELEPHONE ENCOUNTER
----- Message from Jame George MA sent at 11/1/2022 10:36 AM CDT -----  Contact: [phone]  Patient is calling to get information in regards to her procedure. Patient states she is fine being contacted on the portal. Please advise.

## 2022-11-03 ENCOUNTER — PATIENT MESSAGE (OUTPATIENT)
Dept: SURGERY | Facility: HOSPITAL | Age: 52
End: 2022-11-03
Payer: COMMERCIAL

## 2022-11-04 ENCOUNTER — TELEPHONE (OUTPATIENT)
Dept: UROLOGY | Facility: CLINIC | Age: 52
End: 2022-11-04
Payer: COMMERCIAL

## 2022-11-04 DIAGNOSIS — N30.90 BLADDER INFECTION: Primary | ICD-10-CM

## 2022-11-04 RX ORDER — NITROFURANTOIN 25; 75 MG/1; MG/1
100 CAPSULE ORAL 2 TIMES DAILY
Qty: 14 CAPSULE | Refills: 0 | Status: SHIPPED | OUTPATIENT
Start: 2022-11-04 | End: 2022-11-11

## 2022-11-04 NOTE — TELEPHONE ENCOUNTER
Called the patient.  She states at first, there was no leaking around the catheter, this is something new.  Her urine can be concentrated.  But the incontinence does not correlate with the darkness of the urine.     Macrobid was sent empirically.  She will likely have the catheter out on Tuesday.

## 2022-11-07 ENCOUNTER — TELEPHONE (OUTPATIENT)
Dept: UROLOGY | Facility: CLINIC | Age: 52
End: 2022-11-07
Payer: COMMERCIAL

## 2022-11-07 RX ORDER — CEFAZOLIN SODIUM/WATER 2 G/20 ML
2 SYRINGE (ML) INTRAVENOUS
Status: CANCELLED | OUTPATIENT
Start: 2022-11-07

## 2022-11-07 NOTE — TELEPHONE ENCOUNTER
Called patient to confirm arrival time of 11:30am for Cystogram procedure on 11/8/22.  Gave location and explained to pt that there is no fasting for this procedure. Patient verbalized understanding.

## 2022-11-08 ENCOUNTER — HOSPITAL ENCOUNTER (OUTPATIENT)
Facility: HOSPITAL | Age: 52
Discharge: HOME | End: 2022-11-08
Attending: UROLOGY | Admitting: UROLOGY
Payer: COMMERCIAL

## 2022-11-08 ENCOUNTER — TELEPHONE (OUTPATIENT)
Dept: UROLOGY | Facility: CLINIC | Age: 52
End: 2022-11-08

## 2022-11-08 VITALS
SYSTOLIC BLOOD PRESSURE: 143 MMHG | DIASTOLIC BLOOD PRESSURE: 87 MMHG | WEIGHT: 149 LBS | TEMPERATURE: 98 F | OXYGEN SATURATION: 96 % | HEART RATE: 76 BPM | RESPIRATION RATE: 16 BRPM | HEIGHT: 65 IN | BODY MASS INDEX: 24.83 KG/M2

## 2022-11-08 DIAGNOSIS — Z09 SURGICAL FOLLOWUP: Primary | ICD-10-CM

## 2022-11-08 DIAGNOSIS — N32.89 BLADDER SPASM: Primary | ICD-10-CM

## 2022-11-08 DIAGNOSIS — S37.30XA: ICD-10-CM

## 2022-11-08 PROCEDURE — 74450 X-RAY URETHRA/BLADDER: CPT | Mod: 26,59,, | Performed by: UROLOGY

## 2022-11-08 PROCEDURE — 74450 PR  X-RAY URETHROCYSTOGRAM: ICD-10-PCS | Mod: 26,59,, | Performed by: UROLOGY

## 2022-11-08 PROCEDURE — 36000706: Performed by: UROLOGY

## 2022-11-08 PROCEDURE — 51610 INJECTION FOR BLADDER X-RAY: CPT | Mod: ,,, | Performed by: UROLOGY

## 2022-11-08 PROCEDURE — 87077 CULTURE AEROBIC IDENTIFY: CPT | Performed by: UROLOGY

## 2022-11-08 PROCEDURE — 25500020 PHARM REV CODE 255: Performed by: UROLOGY

## 2022-11-08 PROCEDURE — 51610 PR INJECT FOR RETROGRADE URETHOCYSTO: ICD-10-PCS | Mod: ,,, | Performed by: UROLOGY

## 2022-11-08 PROCEDURE — 74430 CONTRAST X-RAY BLADDER: CPT | Mod: 26,,, | Performed by: UROLOGY

## 2022-11-08 PROCEDURE — 87186 SC STD MICRODIL/AGAR DIL: CPT | Performed by: UROLOGY

## 2022-11-08 PROCEDURE — 74430 PR  X-RAY CYSTOGRAM, MIN 3 VIEW: ICD-10-PCS | Mod: 26,,, | Performed by: UROLOGY

## 2022-11-08 PROCEDURE — 36000707: Performed by: UROLOGY

## 2022-11-08 PROCEDURE — 87086 URINE CULTURE/COLONY COUNT: CPT | Performed by: UROLOGY

## 2022-11-08 PROCEDURE — 87088 URINE BACTERIA CULTURE: CPT | Performed by: UROLOGY

## 2022-11-08 RX ORDER — DARIFENACIN 7.5 MG/1
7.5 TABLET, EXTENDED RELEASE ORAL DAILY
Qty: 30 TABLET | Refills: 11 | Status: SHIPPED | OUTPATIENT
Start: 2022-11-08 | End: 2022-11-14

## 2022-11-08 NOTE — H&P
Urology [facility]    HPI:  Ruth Ann Dee is a 52 y.o. female with recent history of urethral injury here for VCUG. .      ROS:  Neg except per HPI    Past Medical History:   Diagnosis Date    a H/O Palpitations     a Mitral Valve Prolapse Ruled Out     10/4/15 Echo (Dr. PERLA Olivas) = Entirely Normal    e Abnormal Thyroid Function Tests     Dr. Juliette Sandifer ([facility]); Repeat TFTs Were Entirely Normal; In 2020    e Family Hx Of Thyroid Disorder     10/20/15 TSH And FT4 = Normal    g Chronic Idiopathic Thrombocytosis     Dr. Kip Valenzuela's 11/20/15 OV Note Reviewed; He Ordered A Lab Work Up Ordered    j Ascending Colon Diverticulosis     Dr. Kyle urbina Internal Hemorrhoids     Dr. Kyle mclain Family Hx Of Breast Cancer     k Fibrocystic Breast Changes     l Bilateral Hand Osteoarthritis     10/20/15 RF, BEVERLEY, UA, ESR And CRP = Normal    l Family Hx Of Rheumatoid Arthritis     Her Mother  From This; 10/20/15 RF, BEVERLEY, UA, ESR And CRP = Normal    l Family Hx Of SLE     10/20/15 RF, BEVERLEY, UA, ESR And CRP = Normal    l Generalized Arthralgias C/W Fibromyalgia ###    10/20/15 RF, BEVERLEY, UA, ESR And CRP = Normal    o Alopecia     10/2/20 Referred To Dr. Lilli Drummond    Wellness Visit 10/02/2020        Past Surgical History:   Procedure Laterality Date    breast lift      BREAST SURGERY      BUNIONECTOMY      COLONOSCOPY  2018    Dr. London    CREATION OF URETHRAL SLING USING POLYPROPYLENE TAPE BY RETROPUBIC APPROACH N/A 10/25/2022    Procedure: SLING, RETROPUBIC WITH SPARC;  Surgeon: Samantha Cardona MD;  Location: [address];  Service: Urology;  Laterality: N/A;  Procedure Aborted secondary to urethra injury.      CYSTOSCOPY N/A 10/25/2022    Procedure: CYSTOSCOPY;  Surgeon: Samantha Cardona MD;  Location: [address];  Service: Urology;  Laterality: N/A;    LAPAROSCOPIC CHOLECYSTECTOMY N/A 2022    Procedure: CHOLECYSTECTOMY, LAPAROSCOPIC;  Surgeon: Rosie Alejandra,  "MD;  Location: [facility] OR;  Service: General;  Laterality: N/A;    TUBAL LIGATION         Social History     Socioeconomic History    Marital status:    Tobacco Use    Smoking status: Never    Smokeless tobacco: Never   Substance and Sexual Activity    Alcohol use: Yes     Comment: occaisionally       Family History   Problem Relation Age of Onset    Arthritis Mother     Cancer Mother     Hypertension Mother     Thyroid disease Mother     Osteoporosis Mother     Breast cancer Mother         50's    Thyroid nodules Mother     Hypertension Father     Pacemaker/defibrilator Father 76    Breast cancer Paternal Grandmother         age unknown    Breast cancer Paternal Cousin         age unknown    Raynaud syndrome Sister     Heart attack Cousin 47        (mom's side)     Heart attack Paternal Grandfather 82       Review of patient's allergies indicates:  No Known Allergies    No current facility-administered medications on file prior to encounter.     Current Outpatient Medications on File Prior to Encounter   Medication Sig Dispense Refill    ascorbic acid/zinc (ZINC WITH VITAMIN C ORAL) Take 3 tablets by mouth once daily.      aspirin (ECOTRIN) 81 MG EC tablet Take 81 mg by mouth once daily.      MULTIVITAMIN ORAL Take 1 tablet by mouth once daily.      oxyCODONE (ROXICODONE) 5 MG immediate release tablet Take 1 tablet (5 mg total) by mouth every 4 (four) hours as needed for Pain. 10 tablet 0    polyethylene glycol (GLYCOLAX) 17 gram/dose powder Dissolve 1 capful (17 grams) in a liquid and take by mouth once daily for 14 days 238 g 0    progesterone (PROMETRIUM) 200 MG capsule Take 200 mg by mouth every evening.      vitamin D (VITAMIN D3) 1000 units Tab Take 1,000 Units by mouth once daily.         Anticoagulation:  No    Physical Exam:  Estimated body mass index is 24.79 kg/m² as calculated from the following:    Height as of this encounter: 5' 5" (1.651 m).    Weight as of this encounter: 67.6 kg (149 " lb).    General: No acute distress, well developed. AAOx3  Head: Normocephalic, Atraumatic  Eyes: Extra-occular movements intact, No discharge  Neck: supple, symmetrical, trachea midline  Lungs: normal respiratory effort, no respiratory distress, no wheezes  CV: regular rate, 2+ pulses  Abdomen: soft, non-tender, non-distended, no organomegaly  MSK: no edema, no deformities, normal ROM  Skin: skin color, texture, turgor normal.  Neurologic: no focal deficits, sensation intact    Labs:    Lab Results   Component Value Date    WBC 16.13 (H) 10/26/2022    WBC 15.97 (H) 10/26/2022    HGB 11.3 (L) 10/26/2022    HGB 11.3 (L) 10/26/2022    HCT 33.1 (L) 10/26/2022    HCT 32.9 (L) 10/26/2022    MCV 95 10/26/2022    MCV 93 10/26/2022     10/26/2022     10/26/2022           BMP  Lab Results   Component Value Date     (L) 10/26/2022     10/26/2022    K 3.7 10/26/2022    K 3.7 10/26/2022     10/26/2022     10/26/2022    CO2 26 10/26/2022    CO2 26 10/26/2022    BUN 9 10/26/2022    BUN 9 10/26/2022    CREATININE 0.7 10/26/2022    CREATININE 0.7 10/26/2022    CALCIUM 8.5 (L) 10/26/2022    CALCIUM 8.8 10/26/2022    ANIONGAP 5 (L) 10/26/2022    ANIONGAP 5 (L) 10/26/2022    ESTGFRAFRICA >60 02/26/2022    EGFRNONAA >60 02/26/2022         Assessment: Ruth Ann Dee is a 52 y.o. female with recent history of urethral injury here for VCUG.     Plan:     1. To OR for VCUG.  2. Consents signed   3. I have explained the risk, benefits, and alternatives of the procedure in detail. The patient voices understanding and all questions have been answered. The patient agrees to proceed as planned.     Anita Muhammad MD    As above.

## 2022-11-08 NOTE — TELEPHONE ENCOUNTER
Called the patient.  She states that Friday, the symptoms got worse, she had a fair amount of gross hematuria on Sunday, but she increased her fluids.  She happened to be at a shower in which there were a number of nurses who reassured her and told her to increase her fluids which she did.  The urine is clearer today.  Will plan to get some urine from the catheter and send it tomorrow.

## 2022-11-08 NOTE — DISCHARGE SUMMARY
[facility]  Discharge Note  Short Stay    Admit Date: 2022    Discharge Date and Time: 2022 2:06 PM      Attending Physician: Samantha Cardona MD     Discharge Provider: Anita Muhammad    Diagnoses:  Past Medical History:   Diagnosis Date    a H/O Palpitations     a Mitral Valve Prolapse Ruled Out     10/4/15 Echo (Dr. PERLA Olivas) = Entirely Normal    e Abnormal Thyroid Function Tests     Dr. Juliette Sandifer ([facility]); Repeat TFTs Were Entirely Normal; In 2020    e Family Hx Of Thyroid Disorder     10/20/15 TSH And FT4 = Normal    g Chronic Idiopathic Thrombocytosis     Dr. Kip Valenzuela's 11/20/15 OV Note Reviewed; He Ordered A Lab Work Up Ordered    j Ascending Colon Diverticulosis     Dr. Kyle urbina Internal Hemorrhoids     Dr. Kyle mclain Family Hx Of Breast Cancer     k Fibrocystic Breast Changes     l Bilateral Hand Osteoarthritis     10/20/15 RF, BEVERLEY, UA, ESR And CRP = Normal    l Family Hx Of Rheumatoid Arthritis     Her Mother  From This; 10/20/15 RF, BEVERLEY, UA, ESR And CRP = Normal    l Family Hx Of SLE     10/20/15 RF, BEVERLEY, UA, ESR And CRP = Normal    l Generalized Arthralgias C/W Fibromyalgia ###    10/20/15 RF, BEVERLEY, UA, ESR And CRP = Normal    o Alopecia     10/2/20 Referred To Dr. Lilli Drummond    Wellness Visit 10/02/2020        Discharged Condition: good    Hospital Course: Patient was admitted for VCUG and tolerated the procedure well with no complications. The patient was discharged home in good condition on the same day.       Final Diagnoses: Same as principal problem.    Disposition: Home or Self Care    Follow up/Patient Instructions:    Medications:  Reconciled Home Medications:   Current Discharge Medication List        START taking these medications    Details   darifenacin (ENABLEX) 7.5 MG Tb24 Take 1 tablet (7.5 mg total) by mouth once daily.  Qty: 30 tablet, Refills: 11    Associated Diagnoses: Bladder spasm           CONTINUE these  medications which have NOT CHANGED    Details   nitrofurantoin, macrocrystal-monohydrate, (MACROBID) 100 MG capsule Take 1 capsule (100 mg total) by mouth 2 (two) times daily. for 7 days  Qty: 14 capsule, Refills: 0    Associated Diagnoses: Bladder infection      ascorbic acid/zinc (ZINC WITH VITAMIN C ORAL) Take 3 tablets by mouth once daily.      aspirin (ECOTRIN) 81 MG EC tablet Take 81 mg by mouth once daily.      MULTIVITAMIN ORAL Take 1 tablet by mouth once daily.      oxyCODONE (ROXICODONE) 5 MG immediate release tablet Take 1 tablet (5 mg total) by mouth every 4 (four) hours as needed for Pain.  Qty: 10 tablet, Refills: 0    Comments: Quantity prescribed more than 7 day supply? No      polyethylene glycol (GLYCOLAX) 17 gram/dose powder Dissolve 1 capful (17 grams) in a liquid and take by mouth once daily for 14 days  Qty: 238 g, Refills: 0      progesterone (PROMETRIUM) 200 MG capsule Take 200 mg by mouth every evening.      vitamin D (VITAMIN D3) 1000 units Tab Take 1,000 Units by mouth once daily.           No discharge procedures on file.   Follow-up Information       Samantha Cardona MD Follow up.    Specialty: Urology  Contact information:  [address]  [place] [address] [phone]                             Discharge Procedure Orders (must include Diet, Follow-up, Activity):  No discharge procedures on file.     As above.

## 2022-11-09 ENCOUNTER — PATIENT MESSAGE (OUTPATIENT)
Dept: UROLOGY | Facility: CLINIC | Age: 52
End: 2022-11-09
Payer: COMMERCIAL

## 2022-11-09 ENCOUNTER — TELEPHONE (OUTPATIENT)
Dept: UROLOGY | Facility: CLINIC | Age: 52
End: 2022-11-09
Payer: COMMERCIAL

## 2022-11-09 DIAGNOSIS — N32.89 BLADDER SPASM: Primary | ICD-10-CM

## 2022-11-09 DIAGNOSIS — N39.46 MIXED STRESS AND URGE URINARY INCONTINENCE: ICD-10-CM

## 2022-11-11 LAB — BACTERIA UR CULT: ABNORMAL

## 2022-11-14 ENCOUNTER — TELEPHONE (OUTPATIENT)
Dept: UROLOGY | Facility: CLINIC | Age: 52
End: 2022-11-14
Payer: COMMERCIAL

## 2022-11-14 ENCOUNTER — PATIENT MESSAGE (OUTPATIENT)
Dept: UROLOGY | Facility: CLINIC | Age: 52
End: 2022-11-14
Payer: COMMERCIAL

## 2022-11-14 DIAGNOSIS — N30.90 BLADDER INFECTION: Primary | ICD-10-CM

## 2022-11-14 RX ORDER — CIPROFLOXACIN 500 MG/1
500 TABLET ORAL 2 TIMES DAILY
Qty: 14 TABLET | Refills: 0 | Status: SHIPPED | OUTPATIENT
Start: 2022-11-14 | End: 2022-11-21

## 2022-11-14 RX ORDER — OXYBUTYNIN CHLORIDE 10 MG/1
10 TABLET, EXTENDED RELEASE ORAL DAILY
Qty: 30 TABLET | Refills: 11 | Status: SHIPPED | OUTPATIENT
Start: 2022-11-14 | End: 2023-02-01 | Stop reason: ALTCHOICE

## 2022-11-22 ENCOUNTER — HOSPITAL ENCOUNTER (OUTPATIENT)
Dept: RADIOLOGY | Facility: HOSPITAL | Age: 52
Discharge: HOME | End: 2022-11-22
Attending: UROLOGY
Payer: COMMERCIAL

## 2022-11-22 DIAGNOSIS — N32.89 BLADDER SPASM: ICD-10-CM

## 2022-11-22 DIAGNOSIS — N39.46 MIXED STRESS AND URGE URINARY INCONTINENCE: ICD-10-CM

## 2022-11-22 PROCEDURE — 74178 CT ABD&PLV WO CNTR FLWD CNTR: CPT | Mod: 26,,, | Performed by: RADIOLOGY

## 2022-11-22 PROCEDURE — 25500020 PHARM REV CODE 255: Mod: PO | Performed by: UROLOGY

## 2022-11-22 PROCEDURE — 74178 CT ABD&PLV WO CNTR FLWD CNTR: CPT | Mod: TC,PO

## 2022-11-22 PROCEDURE — 74178 CT ABDOMEN PELVIS W WO CONTRAST: ICD-10-PCS | Mod: 26,,, | Performed by: RADIOLOGY

## 2022-11-22 RX ADMIN — IOHEXOL 125 ML: 350 INJECTION, SOLUTION INTRAVENOUS at 09:11

## 2022-11-23 ENCOUNTER — OFFICE VISIT (OUTPATIENT)
Dept: UROLOGY | Facility: CLINIC | Age: 52
End: 2022-11-23
Payer: COMMERCIAL

## 2022-11-23 VITALS
SYSTOLIC BLOOD PRESSURE: 123 MMHG | HEIGHT: 65 IN | DIASTOLIC BLOOD PRESSURE: 82 MMHG | BODY MASS INDEX: 26.48 KG/M2 | WEIGHT: 158.94 LBS | HEART RATE: 64 BPM

## 2022-11-23 DIAGNOSIS — N39.41 URGE INCONTINENCE: Primary | ICD-10-CM

## 2022-11-23 PROCEDURE — 1159F PR MEDICATION LIST DOCUMENTED IN MEDICAL RECORD: ICD-10-PCS | Mod: CPTII,S$GLB,, | Performed by: UROLOGY

## 2022-11-23 PROCEDURE — 3079F DIAST BP 80-89 MM HG: CPT | Mod: CPTII,S$GLB,, | Performed by: UROLOGY

## 2022-11-23 PROCEDURE — 3008F BODY MASS INDEX DOCD: CPT | Mod: CPTII,S$GLB,, | Performed by: UROLOGY

## 2022-11-23 PROCEDURE — 99024 PR POST-OP FOLLOW-UP VISIT: ICD-10-PCS | Mod: S$GLB,,, | Performed by: UROLOGY

## 2022-11-23 PROCEDURE — 3008F PR BODY MASS INDEX (BMI) DOCUMENTED: ICD-10-PCS | Mod: CPTII,S$GLB,, | Performed by: UROLOGY

## 2022-11-23 PROCEDURE — 99999 PR PBB SHADOW E&M-EST. PATIENT-LVL III: CPT | Mod: PBBFAC,,, | Performed by: UROLOGY

## 2022-11-23 PROCEDURE — 99024 POSTOP FOLLOW-UP VISIT: CPT | Mod: S$GLB,,, | Performed by: UROLOGY

## 2022-11-23 PROCEDURE — 1159F MED LIST DOCD IN RCRD: CPT | Mod: CPTII,S$GLB,, | Performed by: UROLOGY

## 2022-11-23 PROCEDURE — 3079F PR MOST RECENT DIASTOLIC BLOOD PRESSURE 80-89 MM HG: ICD-10-PCS | Mod: CPTII,S$GLB,, | Performed by: UROLOGY

## 2022-11-23 PROCEDURE — 3074F PR MOST RECENT SYSTOLIC BLOOD PRESSURE < 130 MM HG: ICD-10-PCS | Mod: CPTII,S$GLB,, | Performed by: UROLOGY

## 2022-11-23 PROCEDURE — 3074F SYST BP LT 130 MM HG: CPT | Mod: CPTII,S$GLB,, | Performed by: UROLOGY

## 2022-11-23 PROCEDURE — 99999 PR PBB SHADOW E&M-EST. PATIENT-LVL III: ICD-10-PCS | Mod: PBBFAC,,, | Performed by: UROLOGY

## 2022-11-23 RX ORDER — OXYBUTYNIN CHLORIDE 15 MG/1
15 TABLET, EXTENDED RELEASE ORAL DAILY
Qty: 30 TABLET | Refills: 11 | Status: SHIPPED | OUTPATIENT
Start: 2022-11-23 | End: 2023-11-27 | Stop reason: SDUPTHER

## 2022-11-23 NOTE — PROGRESS NOTES
CHIEF COMPLAINT:    Mrs. Dee is a 52 y.o. female presenting for a follow up after aborted retropubic sling (due to urethral trocar injury)    PRESENTING ILLNESS:    Ruth Ann Dee is a 52 y.o. female who is presents for a follow up on her urinary symptoms.  She is not having dysuria.  She states that the urge incontinence is markedly improved after treatment with the antibiotic.  She has a really smart way to describe her urinary symptoms.  The stress component is like a hurricane, she can brace herself for it.  That part has been better.  The urge incontinence is like a tornado and comes on quickly.  At work, she does urge suppression, waits for a moment until the wave of urgency improves or subsides. She then gets up slowly from her chair then goes to the toilet and does not run.  No hematuria or dysuria. She is taking the oxybutynin XL 10 mg.      PHYSICAL EXAMINATION:    The patient generally appears in good health, is appropriately interactive, and is in no apparent distress.    Skin: No lesions.    Mental: Cooperative with normal affect.    Neuro: Grossly intact.    HEENT: Normal. No evidence of lymphadenopathy.    Chest:  normal inspiratory effort.    Extremities: No clubbing, cyanosis, or edema      LABS:    Lab Results   Component Value Date    BUN 9 10/26/2022    BUN 9 10/26/2022    CREATININE 0.7 10/26/2022    CREATININE 0.7 10/26/2022       UA 1.005, pH 7, otherwise, negative.     IMPRESSION:    Mixed incontinence, urge predominant    PLAN:    1.  Increased the dose of the oxybutynin XL to 15 mg once a day.  Watch for side effects of dry mouth and constipation.   2.  OK to resume sexual activity after Dec 6th  3.  Follow up in 3 months.

## 2023-01-31 NOTE — OP NOTE
[facility]  Operative Note    Date: 11/08/2022    Pre-Op Diagnosis: urethral injury    Post-Op Diagnosis: same    Procedure(s) Performed:   1.  Cystogram  2.  Fluoro < 1 h  3.  Stephanie-catheter urethrogram    Specimen(s): urine for culture    Staff Surgeon: Samantha Cardona MD    Assistant Surgeon: Anita Muhammad MD    Anesthesia: none    Indications: Ruth Ann Dee is a 52 y.o. female with recent urethral injury.    Findings:   No contrast extravasation on cystogram or stephanie-catheter urethrogram.  Vu removed.    Estimated Blood Loss: none    Drains: none    Procedure in Detail:  After informed consent was obtained the patient was brought to the cystoscopy suite and placed in the supine position.  A 16 Fr vu catheter was indwelling.  This was filled with 250cc of contrast.      Fluoroscopy was used intermittently during the cystogram.  This showed a normal bladder contour without extravasation.  A stephanie-catheter urethrogram was performed and there was no contrast extravasation. Based on this, the vu was removed.      The patient tolerated the procedure well and was discharged home.      Disposition:  That patient will follow up with Dr. Cardona.      Anita Muhammad MD    I was present for the entire case and agree with the above note.     Rituxan Counseling:  I discussed with the patient the risks of Rituxan infusions. Side effects can include infusion reactions, severe drug rashes including mucocutaneous reactions, reactivation of latent hepatitis and other infections and rarely progressive multifocal leukoencephalopathy.  All of the patient's questions and concerns were addressed.

## 2023-03-17 ENCOUNTER — PATIENT MESSAGE (OUTPATIENT)
Dept: RESEARCH | Facility: HOSPITAL | Age: 53
End: 2023-03-17
Payer: COMMERCIAL

## 2023-04-17 ENCOUNTER — PATIENT MESSAGE (OUTPATIENT)
Dept: RHEUMATOLOGY | Facility: CLINIC | Age: 53
End: 2023-04-17
Payer: COMMERCIAL

## 2023-04-27 ENCOUNTER — PATIENT MESSAGE (OUTPATIENT)
Dept: RHEUMATOLOGY | Facility: CLINIC | Age: 53
End: 2023-04-27
Payer: COMMERCIAL

## 2023-05-30 ENCOUNTER — OFFICE VISIT (OUTPATIENT)
Dept: RHEUMATOLOGY | Facility: CLINIC | Age: 53
End: 2023-05-30
Payer: COMMERCIAL

## 2023-05-30 VITALS
HEIGHT: 65 IN | WEIGHT: 164.88 LBS | DIASTOLIC BLOOD PRESSURE: 88 MMHG | HEART RATE: 58 BPM | SYSTOLIC BLOOD PRESSURE: 134 MMHG | BODY MASS INDEX: 27.47 KG/M2

## 2023-05-30 DIAGNOSIS — M19.042 PRIMARY OSTEOARTHRITIS OF BOTH HANDS: ICD-10-CM

## 2023-05-30 DIAGNOSIS — L65.9 ALOPECIA: ICD-10-CM

## 2023-05-30 DIAGNOSIS — M15.9 PRIMARY OSTEOARTHRITIS INVOLVING MULTIPLE JOINTS: ICD-10-CM

## 2023-05-30 DIAGNOSIS — M19.041 PRIMARY OSTEOARTHRITIS OF BOTH HANDS: ICD-10-CM

## 2023-05-30 DIAGNOSIS — R76.8 POSITIVE ANA (ANTINUCLEAR ANTIBODY): Primary | ICD-10-CM

## 2023-05-30 PROCEDURE — 1159F MED LIST DOCD IN RCRD: CPT | Mod: CPTII,S$GLB,, | Performed by: INTERNAL MEDICINE

## 2023-05-30 PROCEDURE — 3008F BODY MASS INDEX DOCD: CPT | Mod: CPTII,S$GLB,, | Performed by: INTERNAL MEDICINE

## 2023-05-30 PROCEDURE — 3075F SYST BP GE 130 - 139MM HG: CPT | Mod: CPTII,S$GLB,, | Performed by: INTERNAL MEDICINE

## 2023-05-30 PROCEDURE — 99215 PR OFFICE/OUTPT VISIT, EST, LEVL V, 40-54 MIN: ICD-10-PCS | Mod: S$GLB,,, | Performed by: INTERNAL MEDICINE

## 2023-05-30 PROCEDURE — 3079F DIAST BP 80-89 MM HG: CPT | Mod: CPTII,S$GLB,, | Performed by: INTERNAL MEDICINE

## 2023-05-30 PROCEDURE — 3079F PR MOST RECENT DIASTOLIC BLOOD PRESSURE 80-89 MM HG: ICD-10-PCS | Mod: CPTII,S$GLB,, | Performed by: INTERNAL MEDICINE

## 2023-05-30 PROCEDURE — 99999 PR PBB SHADOW E&M-EST. PATIENT-LVL III: CPT | Mod: PBBFAC,,, | Performed by: INTERNAL MEDICINE

## 2023-05-30 PROCEDURE — 3075F PR MOST RECENT SYSTOLIC BLOOD PRESS GE 130-139MM HG: ICD-10-PCS | Mod: CPTII,S$GLB,, | Performed by: INTERNAL MEDICINE

## 2023-05-30 PROCEDURE — 99999 PR PBB SHADOW E&M-EST. PATIENT-LVL III: ICD-10-PCS | Mod: PBBFAC,,, | Performed by: INTERNAL MEDICINE

## 2023-05-30 PROCEDURE — 3008F PR BODY MASS INDEX (BMI) DOCUMENTED: ICD-10-PCS | Mod: CPTII,S$GLB,, | Performed by: INTERNAL MEDICINE

## 2023-05-30 PROCEDURE — 1159F PR MEDICATION LIST DOCUMENTED IN MEDICAL RECORD: ICD-10-PCS | Mod: CPTII,S$GLB,, | Performed by: INTERNAL MEDICINE

## 2023-05-30 PROCEDURE — 99215 OFFICE O/P EST HI 40 MIN: CPT | Mod: S$GLB,,, | Performed by: INTERNAL MEDICINE

## 2023-05-30 ASSESSMENT — ROUTINE ASSESSMENT OF PATIENT INDEX DATA (RAPID3)
TOTAL RAPID3 SCORE: 0.5
PATIENT GLOBAL ASSESSMENT SCORE: 0
FATIGUE SCORE: 0
MDHAQ FUNCTION SCORE: 0
PAIN SCORE: 1.5
PSYCHOLOGICAL DISTRESS SCORE: 0

## 2023-05-30 NOTE — PROGRESS NOTES
"Subjective:          Chief Complaint: Ruth Ann Dee is a 52 y.o. female who had no chief complaint listed for this encounter.    HPI:    Patient is a 52-year-old female primarily LOV with me 2019 for +BEVERLEY 1:1280 with a +histone antibody .   She does have a past medical history for thrombocytosis seen with Dr. Valenzuela in past. Plt have been stable in 3-400s.     Patient has chronic baseline discomfort with fascial/myalgia. She works with Milla David, PT-which has been great.   AM stiffness is upper trapezius, cervical spine, and greater troch region. She has notable restriction in various joints for motion.   Patient  with a dx of OA thus far.   She is using Aleve when she notes multiple days in a row of arthritis/myalgia. She is noting DIP manuel changes.   No AM stiffness, no swelling.     Her BEVERLEY at the 06/07/2018 is positive 1:1280 in a speckled pattern she had a previous BEVERLEY from 2015 that was negative remaining serologies negative, rheumatoid factor negative x2. +TPO Ab. (TPO 2021 negative)  ESR and CRP WNL    We tried using Ibuprofen with gastritis, she was using Mobic PRN stopped this. She is doing well aleve.     She noted she was not feeling "well" or at her baseline for few months prior to June 2018 with generalized malaise, fatigue, she notes   No photosensitivity, no rashes. Diffuse hair loss no worrisome. No Raynaud,  No nephritis/ no hematuria  No ILD, pleurisy  No pericarditis/ +MVP stable  No IBD.   + thrombocytosis, compoleted w/up with Bianca as above. No DVT, seizure, miscarriage x 1 <10 weeks  3 healthy kids   No thyroid thus far.     Bilateral lateral hips with pain,-PT, chiro ITB syndome. chronic  Hands: stiff painful. CMC joint pain. Right is very tender to severe, with certain motion or contact.   Does tolerate playing tennis.      Chronic since 2007 (approx)- seen with hand specialist. At that time. Negative pain with jars, bottles. Jewelry is stable.     Family history of a mother with " severe rheumatoid arthritis. Siblings with variety of differing autoimmunities: Sister: Sjogrens, Raynauds and ?DIL    Component      Latest Ref Rng & Units 12/13/2018 6/7/2018   Anti Sm/RNP Antibody      0.00 - 19.99 EU 2.87    Anti-Sm/RNP Interpretation      Negative Negative    Anti Sm Antibody      0.00 - 19.99 EU 1.68    Anti-Sm Interpretation      Negative Negative    Anti-SSA Antibody      0.00 - 19.99 EU 2.54    Anti-SSA Interpretation      Negative Negative    Anti-SSB Antibody      0.00 - 19.99 EU 0.98    Anti-SSB Interpretation      Negative Negative    Rheumatoid Factor      0.0 - 15.0 IU/mL  <8.6   HIV 1/2 Ag/Ab      Negative  Negative   BEVERLEY IgG by IFA      <1:80  1:1280 (H)   ds DNA Ab      Negative 1:10 Negative 1:10    Anti-Histone Antibody      0.0 - 0.9 Units 2.8 (H)    Thyroglobulin Ab Screen      0.0 - 3.9 IU/mL <4.0    Complement (C-3)      50 - 180 mg/dL 101    Complement (C-4)      11 - 44 mg/dL 17    Thyroperoxidase Antibodies      <6.0 IU/mL 11.0 (H)    Complement,Total, Serum      42 - 95 U/mL 51    CCP Antibodies      <5.0 U/mL 0.8      Component      Latest Ref Rng & Units 6/7/2018   Rheumatoid Factor      0.0 - 15.0 IU/mL <8.6   Sed Rate      0 - 19 mm/Hr 9   CRP      0.00 - 0.90 mg/dL <0.50   HIV 1/2 Ag/Ab      Negative Negative   BEVERLEY IgG by IFA      <1:80 1:1280 (H)     REVIEW OF SYSTEMS:    Review of Systems   Constitutional:  Negative for fever, malaise/fatigue and weight loss.   HENT:  Negative for sore throat.    Eyes:  Negative for double vision, photophobia and redness.   Respiratory:  Negative for cough, shortness of breath and wheezing.    Cardiovascular:  Negative for chest pain, palpitations and orthopnea.   Gastrointestinal:  Negative for abdominal pain, constipation and diarrhea.   Genitourinary:  Negative for dysuria, hematuria and urgency.   Musculoskeletal:  Positive for joint pain. Negative for back pain and myalgias.   Skin:  Negative for rash.   Neurological:   Negative for dizziness, tingling, focal weakness and headaches.   Endo/Heme/Allergies:  Does not bruise/bleed easily.   Psychiatric/Behavioral:  Negative for depression, hallucinations and suicidal ideas.              Objective:            Past Medical History:   Diagnosis Date    a H/O Palpitations     a Mitral Valve Prolapse Ruled Out     10/4/15 Echo (Dr. PERLA Olivas) = Entirely Normal    e Abnormal Thyroid Function Tests     Dr. Juliette Sandifer ([facility]); Repeat TFTs Were Entirely Normal; In 2020    e Family Hx Of Thyroid Disorder     10/20/15 TSH And FT4 = Normal    g Chronic Idiopathic Thrombocytosis     Dr. Kip Valenzuela's 11/20/15 OV Note Reviewed; He Ordered A Lab Work Up Ordered    j Ascending Colon Diverticulosis     Dr. Kyle urbina Internal Hemorrhoids     Dr. Kyle mclain Family Hx Of Breast Cancer     k Fibrocystic Breast Changes     l Bilateral Hand Osteoarthritis     10/20/15 RF, BEVERLEY, UA, ESR And CRP = Normal    l Family Hx Of Rheumatoid Arthritis     Her Mother  From This; 10/20/15 RF, BEVERLEY, UA, ESR And CRP = Normal    l Family Hx Of SLE     10/20/15 RF, BEVERLEY, UA, ESR And CRP = Normal    l Generalized Arthralgias C/W Fibromyalgia ###    10/20/15 RF, BEVERLEY, UA, ESR And CRP = Normal    o Alopecia     10/2/20 Referred To Dr. Lilli Drummond    Wellness Visit 10/02/2020      Family History   Problem Relation Age of Onset    Arthritis Mother     Cancer Mother     Hypertension Mother     Thyroid disease Mother     Osteoporosis Mother     Breast cancer Mother         50's    Thyroid nodules Mother     Hypertension Father     Pacemaker/defibrilator Father 76    Breast cancer Paternal Grandmother         age unknown    Breast cancer Paternal Cousin         age unknown    Raynaud syndrome Sister     Heart attack Cousin 47        (mom's side)     Heart attack Paternal Grandfather 82     Social History     Tobacco Use    Smoking status: Never    Smokeless tobacco: Never   Substance Use Topics     Alcohol use: Yes     Comment: occaisionally         Current Outpatient Medications on File Prior to Visit   Medication Sig Dispense Refill    ascorbic acid/zinc (ZINC WITH VITAMIN C ORAL) Take 3 tablets by mouth once daily.      aspirin (ECOTRIN) 81 MG EC tablet Take 81 mg by mouth once daily.      MULTIVITAMIN ORAL Take 1 tablet by mouth once daily.      omeprazole (PRILOSEC) 40 MG capsule TAKE 1 CAPSULE(40 MG) BY MOUTH EVERY MORNING 30 capsule 2    oxybutynin (DITROPAN XL) 15 MG TR24 Take 1 tablet (15 mg total) by mouth once daily. 30 tablet 11    vitamin D (VITAMIN D3) 1000 units Tab Take 1,000 Units by mouth once daily.       No current facility-administered medications on file prior to visit.       There were no vitals filed for this visit.      Physical Exam:    Physical Exam  Constitutional:       Appearance: She is well-developed.   HENT:      Head: Normocephalic and atraumatic.   Eyes:      Pupils: Pupils are equal, round, and reactive to light.   Cardiovascular:      Rate and Rhythm: Normal rate and regular rhythm.      Heart sounds: Normal heart sounds.   Pulmonary:      Effort: Pulmonary effort is normal.      Breath sounds: Normal breath sounds.   Musculoskeletal:      Right shoulder: No swelling or tenderness. Normal range of motion.      Left shoulder: No swelling or tenderness. Normal range of motion.      Right elbow: No swelling. Normal range of motion. No tenderness.      Left elbow: No swelling. Normal range of motion. No tenderness.      Right wrist: No swelling or tenderness. Normal range of motion.      Left wrist: No swelling or tenderness. Normal range of motion.      Right hand: No swelling or tenderness. Normal range of motion.      Left hand: No swelling or tenderness. Normal range of motion.      Cervical back: Normal range of motion.      Right knee: No swelling. Normal range of motion. No tenderness.      Left knee: No swelling. Normal range of motion. No tenderness.      Right  foot: Normal range of motion. No swelling or tenderness.      Left foot: Normal range of motion. No swelling or tenderness.   Skin:     General: Skin is warm and dry.   Neurological:      Mental Status: She is alert and oriented to person, place, and time.   Psychiatric:         Behavior: Behavior normal.             Assessment:       Encounter Diagnoses   Name Primary?    Positive BEVERLEY (antinuclear antibody) Yes    Primary osteoarthritis involving multiple joints             Plan:        Positive BEVERLEY (antinuclear antibody)  -     Comprehensive Metabolic Panel; Standing; Expected date: 05/30/2023  -     Sedimentation rate; Standing; Expected date: 05/30/2023  -     C-Reactive Protein; Standing; Expected date: 05/30/2023  -     Anti-DNA Ab, Double-Stranded; Future; Expected date: 05/30/2023  -     C3 Complement; Future; Expected date: 05/30/2023  -     C4 COMPLEMENT; Future; Expected date: 05/30/2023  -     BEVERLEY Screen w/Reflex; Future; Expected date: 05/30/2023  -     Anti-Smith Antibody; Future; Expected date: 05/30/2023  -     Anti-Histone Antibody; Future; Expected date: 05/30/2023  -     Anti Sm/RNP Antibody; Future; Expected date: 05/30/2023  -     Sjogrens syndrome-A extractable nuclear antibody; Future; Expected date: 05/30/2023  -     Sjogrens syndrome-B extractable nuclear antibody; Future; Expected date: 05/30/2023    Primary osteoarthritis involving multiple joints    Primary osteoarthritis of both hands    Alopecia      +BEVERLEY 1:1280 with negative SUE, and +histone Ab, repeat 2021 with 1:640 speckled.     -I want to update serologies but overall I suspect this is degenerative arthrtis.  with fatigue. No active synovitis. Suspect this is OA hands with progression for the DIP joints in addition to CMC.     Mother with RA.   Exacerbated with cold and use.       Follow up in about 4 months (around 9/30/2023).      40min consultation with greater than 50% spent in counseling, chart review and coordination of  care. All questions answered.  Thank you for allowing me to participate in the care of this very pleasant patient.

## 2023-06-02 ENCOUNTER — PATIENT MESSAGE (OUTPATIENT)
Dept: RHEUMATOLOGY | Facility: CLINIC | Age: 53
End: 2023-06-02
Payer: COMMERCIAL

## 2023-06-20 ENCOUNTER — PATIENT MESSAGE (OUTPATIENT)
Dept: RHEUMATOLOGY | Facility: CLINIC | Age: 53
End: 2023-06-20
Payer: COMMERCIAL

## 2023-06-20 NOTE — TELEPHONE ENCOUNTER
Reviewed all of patient's labs.   BEVERLEY + 1:320 homogenous  dsDNA by JAZMINE was + , followed by negative crithidia.   Histone + moderate titer.   Complement WNL  Sed rate and C-reactive protein completely normal.

## 2023-09-27 ENCOUNTER — OFFICE VISIT (OUTPATIENT)
Dept: RHEUMATOLOGY | Facility: CLINIC | Age: 53
End: 2023-09-27
Payer: COMMERCIAL

## 2023-09-27 VITALS
SYSTOLIC BLOOD PRESSURE: 147 MMHG | HEIGHT: 65 IN | BODY MASS INDEX: 27.05 KG/M2 | DIASTOLIC BLOOD PRESSURE: 86 MMHG | WEIGHT: 162.38 LBS | HEART RATE: 71 BPM

## 2023-09-27 DIAGNOSIS — R76.8 POSITIVE ANA (ANTINUCLEAR ANTIBODY): Primary | ICD-10-CM

## 2023-09-27 DIAGNOSIS — M35.9 UNDIFFERENTIATED CONNECTIVE TISSUE DISEASE: ICD-10-CM

## 2023-09-27 PROCEDURE — 3077F SYST BP >= 140 MM HG: CPT | Mod: CPTII,S$GLB,, | Performed by: INTERNAL MEDICINE

## 2023-09-27 PROCEDURE — 3077F PR MOST RECENT SYSTOLIC BLOOD PRESSURE >= 140 MM HG: ICD-10-PCS | Mod: CPTII,S$GLB,, | Performed by: INTERNAL MEDICINE

## 2023-09-27 PROCEDURE — 1160F RVW MEDS BY RX/DR IN RCRD: CPT | Mod: CPTII,S$GLB,, | Performed by: INTERNAL MEDICINE

## 2023-09-27 PROCEDURE — 99214 PR OFFICE/OUTPT VISIT, EST, LEVL IV, 30-39 MIN: ICD-10-PCS | Mod: S$GLB,,, | Performed by: INTERNAL MEDICINE

## 2023-09-27 PROCEDURE — 3079F PR MOST RECENT DIASTOLIC BLOOD PRESSURE 80-89 MM HG: ICD-10-PCS | Mod: CPTII,S$GLB,, | Performed by: INTERNAL MEDICINE

## 2023-09-27 PROCEDURE — 3008F BODY MASS INDEX DOCD: CPT | Mod: CPTII,S$GLB,, | Performed by: INTERNAL MEDICINE

## 2023-09-27 PROCEDURE — 1160F PR REVIEW ALL MEDS BY PRESCRIBER/CLIN PHARMACIST DOCUMENTED: ICD-10-PCS | Mod: CPTII,S$GLB,, | Performed by: INTERNAL MEDICINE

## 2023-09-27 PROCEDURE — 3079F DIAST BP 80-89 MM HG: CPT | Mod: CPTII,S$GLB,, | Performed by: INTERNAL MEDICINE

## 2023-09-27 PROCEDURE — 1159F MED LIST DOCD IN RCRD: CPT | Mod: CPTII,S$GLB,, | Performed by: INTERNAL MEDICINE

## 2023-09-27 PROCEDURE — 3008F PR BODY MASS INDEX (BMI) DOCUMENTED: ICD-10-PCS | Mod: CPTII,S$GLB,, | Performed by: INTERNAL MEDICINE

## 2023-09-27 PROCEDURE — 99999 PR PBB SHADOW E&M-EST. PATIENT-LVL III: ICD-10-PCS | Mod: PBBFAC,,, | Performed by: INTERNAL MEDICINE

## 2023-09-27 PROCEDURE — 1159F PR MEDICATION LIST DOCUMENTED IN MEDICAL RECORD: ICD-10-PCS | Mod: CPTII,S$GLB,, | Performed by: INTERNAL MEDICINE

## 2023-09-27 PROCEDURE — 99999 PR PBB SHADOW E&M-EST. PATIENT-LVL III: CPT | Mod: PBBFAC,,, | Performed by: INTERNAL MEDICINE

## 2023-09-27 PROCEDURE — 99214 OFFICE O/P EST MOD 30 MIN: CPT | Mod: S$GLB,,, | Performed by: INTERNAL MEDICINE

## 2023-09-27 RX ORDER — HYDROXYCHLOROQUINE SULFATE 200 MG/1
200 TABLET, FILM COATED ORAL DAILY
Qty: 30 TABLET | Refills: 6 | Status: SHIPPED | OUTPATIENT
Start: 2023-09-27 | End: 2024-09-26

## 2023-09-27 RX ORDER — SPIRONOLACTONE 100 MG/1
100 TABLET, FILM COATED ORAL EVERY MORNING
COMMUNITY
Start: 2023-08-14

## 2023-09-27 ASSESSMENT — ROUTINE ASSESSMENT OF PATIENT INDEX DATA (RAPID3)
PATIENT GLOBAL ASSESSMENT SCORE: 0
MDHAQ FUNCTION SCORE: 0
FATIGUE SCORE: 0
PAIN SCORE: 2
PSYCHOLOGICAL DISTRESS SCORE: 0
TOTAL RAPID3 SCORE: 0.67

## 2023-09-27 NOTE — PROGRESS NOTES
"Subjective:          Chief Complaint: Ruth Ann Dee is a 53 y.o. female who had concerns including Disease Management.    HPI:    Patient is a 53-year-old female primarily LOV with me 2019 for +BEVERLEY 1:1280 with a +histone antibody .   She does have a past medical history for thrombocytosis seen with Dr. Valenzuela in past. Plt have been stable in 3-400s.     Patient has chronic baseline discomfort with fascial/myalgia. She works with [employer], PT-which has been great.   AM stiffness is upper trapezius, cervical spine, and greater troch region. She has notable restriction in various joints for motion.   Patient  with a dx of OA thus far.   She is using Aleve when she notes multiple days in a row of arthritis/myalgia. She is noting DIP manuel changes.   No AM stiffness, no swelling.     Her BEVERLEY at the 06/07/2018 is positive 1:1280 in a speckled pattern she had a previous BEVERLEY from 2015 that was negative remaining serologies negative, rheumatoid factor negative x2. +TPO Ab. (TPO 2021 negative)  ESR and CRP WNL    We tried using Ibuprofen with gastritis, she was using Mobic PRN stopped this. She is doing well aleve.     She noted she was not feeling "well" or at her baseline for few months prior to June 2018 with generalized malaise, fatigue, she notes   No photosensitivity, no rashes. Diffuse hair loss no worrisome. No Raynaud,  No nephritis/ no hematuria  No ILD, pleurisy  No pericarditis/ +MVP stable  No IBD.   + thrombocytosis, compoleted w/up with Bianca as above. No DVT, seizure, miscarriage x 1 <10 weeks  3 healthy kids   No thyroid thus far.     Bilateral lateral hips with pain,-PT, chiro ITB syndome. chronic  Hands: stiff painful. CMC joint pain. Right is very tender to severe, with certain motion or contact.   Does tolerate playing tennis.      Chronic since 2007 (approx)- seen with hand specialist. At that time. Negative pain with jars, bottles. Jewelry is stable.     Family history of a mother with severe " rheumatoid arthritis. Siblings with variety of differing autoimmunities: Sister: Sjogrens, Raynauds and ?DIL    Labs from 5/2023 summary: First I am so very sorry it took this long. That was my error. Your labs continue to be unique in that you are making the BEVERLEY antibody, albeit at a much lower concentration that previous.   The new antibody to be positive is  dsDNA which on screening  is +, but on confirmation testing initially I was geiven results of negative, but I see now the titer did result with 59 which falls in the indeterminant.     The histone is still + and is the antibody I use to help be sort through all of this.  At this time you continue to make a myriad of antibodies but show not clear evidence of active systemic lupus in joints/physical exam or by blood tests. I will still place you in the the category of at risk, but likely still degenerative joint pains and not start any therapy other than aleve or tylenol as tolerated.      Plan has been just to monitor her.       REVIEW OF SYSTEMS:    Review of Systems   Constitutional:  Negative for fever, malaise/fatigue and weight loss.   HENT:  Negative for sore throat.    Eyes:  Negative for double vision, photophobia and redness.   Respiratory:  Negative for cough, shortness of breath and wheezing.    Cardiovascular:  Negative for chest pain, palpitations and orthopnea.   Gastrointestinal:  Negative for abdominal pain, constipation and diarrhea.   Genitourinary:  Negative for dysuria, hematuria and urgency.   Musculoskeletal:  Positive for joint pain. Negative for back pain and myalgias.   Skin:  Negative for rash.   Neurological:  Negative for dizziness, tingling, focal weakness and headaches.   Endo/Heme/Allergies:  Does not bruise/bleed easily.   Psychiatric/Behavioral:  Negative for depression, hallucinations and suicidal ideas.                Objective:            Past Medical History:   Diagnosis Date    a H/O Palpitations     a Mitral Valve  Prolapse Ruled Out     10/4/15 Echo (Dr. PERLA Olivas) = Entirely Normal    e Abnormal Thyroid Function Tests     Dr. Juliette Sandifer ([facility]); Repeat TFTs Were Entirely Normal; In 2020    e Family Hx Of Thyroid Disorder     10/20/15 TSH And FT4 = Normal    g Chronic Idiopathic Thrombocytosis     Dr. Kip Valenzuela's 11/20/15 OV Note Reviewed; He Ordered A Lab Work Up Ordered    j Ascending Colon Diverticulosis     Dr. Kyle urbina Internal Hemorrhoids     Dr. Kyle mclain Family Hx Of Breast Cancer     k Fibrocystic Breast Changes     l Bilateral Hand Osteoarthritis     10/20/15 RF, BEVERLEY, UA, ESR And CRP = Normal    l Family Hx Of Rheumatoid Arthritis     Her Mother  From This; 10/20/15 RF, BEVERLEY, UA, ESR And CRP = Normal    l Family Hx Of SLE     10/20/15 RF, BEVERLEY, UA, ESR And CRP = Normal    l Generalized Arthralgias C/W Fibromyalgia ###    10/20/15 RF, BEVERLEY, UA, ESR And CRP = Normal    o Alopecia     10/2/20 Referred To Dr. Lilli Drummond    Wellness Visit 10/02/2020      Family History   Problem Relation Age of Onset    Arthritis Mother     Cancer Mother     Hypertension Mother     Thyroid disease Mother     Osteoporosis Mother     Breast cancer Mother         50's    Thyroid nodules Mother     Hypertension Father     Pacemaker/defibrilator Father 76    Breast cancer Paternal Grandmother         age unknown    Breast cancer Paternal Cousin         age unknown    Raynaud syndrome Sister     Heart attack Cousin 47        (mom's side)     Heart attack Paternal Grandfather 82     Social History     Tobacco Use    Smoking status: Never    Smokeless tobacco: Never   Substance Use Topics    Alcohol use: Yes     Comment: occaisionally         Current Outpatient Medications on File Prior to Visit   Medication Sig Dispense Refill    ascorbic acid/zinc (ZINC WITH VITAMIN C ORAL) Take 3 tablets by mouth once daily.      aspirin (ECOTRIN) 81 MG EC tablet Take 81 mg by mouth once daily.      MULTIVITAMIN ORAL  Take 1 tablet by mouth once daily.      omeprazole (PRILOSEC) 40 MG capsule TAKE 1 CAPSULE(40 MG) BY MOUTH EVERY MORNING 30 capsule 2    oxybutynin (DITROPAN XL) 15 MG TR24 Take 1 tablet (15 mg total) by mouth once daily. 30 tablet 11    spironolactone (ALDACTONE) 100 MG tablet Take 100 mg by mouth.      vitamin D (VITAMIN D3) 1000 units Tab Take 1,000 Units by mouth once daily.       No current facility-administered medications on file prior to visit.       Vitals:    09/27/23 1130   BP: (!) 147/86   Pulse: 71         Physical Exam:    Physical Exam  Constitutional:       Appearance: She is well-developed.   HENT:      Head: Normocephalic and atraumatic.   Eyes:      Pupils: Pupils are equal, round, and reactive to light.   Cardiovascular:      Rate and Rhythm: Normal rate and regular rhythm.      Heart sounds: Normal heart sounds.   Pulmonary:      Effort: Pulmonary effort is normal.      Breath sounds: Normal breath sounds.   Musculoskeletal:      Right shoulder: No swelling or tenderness. Normal range of motion.      Left shoulder: No swelling or tenderness. Normal range of motion.      Right elbow: No swelling. Normal range of motion. No tenderness.      Left elbow: No swelling. Normal range of motion. No tenderness.      Right wrist: No swelling or tenderness. Normal range of motion.      Left wrist: No swelling or tenderness. Normal range of motion.      Right hand: No swelling or tenderness. Normal range of motion.      Left hand: No swelling or tenderness. Normal range of motion.      Cervical back: Normal range of motion.      Right knee: No swelling. Normal range of motion. No tenderness.      Left knee: No swelling. Normal range of motion. No tenderness.      Right foot: Normal range of motion. No swelling or tenderness.      Left foot: Normal range of motion. No swelling or tenderness.   Skin:     General: Skin is warm and dry.   Neurological:      Mental Status: She is alert and oriented to person,  place, and time.   Psychiatric:         Behavior: Behavior normal.               Assessment:       Encounter Diagnoses   Name Primary?    Positive BEVERLEY (antinuclear antibody) Yes    Undifferentiated connective tissue disease               Plan:        Positive BEVERLEY (antinuclear antibody)  -     CBC Auto Differential; Standing; Expected date: 09/27/2023  -     Comprehensive Metabolic Panel; Standing; Expected date: 09/27/2023  -     Sedimentation rate; Standing; Expected date: 09/27/2023  -     C-Reactive Protein; Standing; Expected date: 09/27/2023  -     Anti-DNA Ab, Double-Stranded; Future; Expected date: 09/27/2023  -     hydroxychloroquine (PLAQUENIL) 200 mg tablet; Take 1 tablet (200 mg total) by mouth once daily.  Dispense: 30 tablet; Refill: 6    Undifferentiated connective tissue disease  -     CBC Auto Differential; Standing; Expected date: 09/27/2023  -     Comprehensive Metabolic Panel; Standing; Expected date: 09/27/2023  -     Sedimentation rate; Standing; Expected date: 09/27/2023  -     C-Reactive Protein; Standing; Expected date: 09/27/2023  -     Anti-DNA Ab, Double-Stranded; Future; Expected date: 09/27/2023  -     hydroxychloroquine (PLAQUENIL) 200 mg tablet; Take 1 tablet (200 mg total) by mouth once daily.  Dispense: 30 tablet; Refill: 6      +BEVERLEY 1:1280 with negative SUE, and +histone Ab, repeat 2021 with 1:640 speckled.     -I want to update serologies but overall I suspect this is degenerative arthrtis.  with fatigue. No active synovitis. Suspect this is OA hands with progression for the DIP joints in addition to CMC.     Mother with RA.   Exacerbated with cold and use.   HCQ 200mg BID we are going to trial.     Follow up in about 4 months (around 1/27/2024).      30min consultation with greater than 50% spent in counseling, chart review and coordination of care. All questions answered.  Thank you for allowing me to participate in the care of this very pleasant patient.

## 2023-11-22 PROBLEM — N95.0 POSTMENOPAUSAL BLEEDING: Status: ACTIVE | Noted: 2023-11-22

## 2023-11-22 PROBLEM — N84.0 ENDOMETRIAL POLYP: Status: ACTIVE | Noted: 2023-11-22

## 2023-11-27 DIAGNOSIS — N39.41 URGE INCONTINENCE: ICD-10-CM

## 2023-11-27 RX ORDER — OXYBUTYNIN CHLORIDE 15 MG/1
15 TABLET, EXTENDED RELEASE ORAL DAILY
Qty: 30 TABLET | Refills: 0 | Status: SHIPPED | OUTPATIENT
Start: 2023-11-27 | End: 2024-01-25 | Stop reason: ALTCHOICE

## 2024-01-05 ENCOUNTER — PATIENT MESSAGE (OUTPATIENT)
Dept: UROLOGY | Facility: CLINIC | Age: 54
End: 2024-01-05
Payer: COMMERCIAL

## 2024-01-17 ENCOUNTER — OFFICE VISIT (OUTPATIENT)
Dept: UROLOGY | Facility: CLINIC | Age: 54
End: 2024-01-17
Payer: COMMERCIAL

## 2024-01-17 VITALS
WEIGHT: 160.5 LBS | SYSTOLIC BLOOD PRESSURE: 134 MMHG | DIASTOLIC BLOOD PRESSURE: 83 MMHG | HEIGHT: 65 IN | HEART RATE: 88 BPM | BODY MASS INDEX: 26.74 KG/M2

## 2024-01-17 DIAGNOSIS — N39.41 URGE INCONTINENCE: Primary | ICD-10-CM

## 2024-01-17 PROCEDURE — 99214 OFFICE O/P EST MOD 30 MIN: CPT | Mod: 25,S$GLB,, | Performed by: UROLOGY

## 2024-01-17 PROCEDURE — 81002 URINALYSIS NONAUTO W/O SCOPE: CPT | Mod: S$GLB,,, | Performed by: UROLOGY

## 2024-01-17 PROCEDURE — 51701 INSERT BLADDER CATHETER: CPT | Mod: S$GLB,,, | Performed by: UROLOGY

## 2024-01-17 PROCEDURE — 3079F DIAST BP 80-89 MM HG: CPT | Mod: CPTII,S$GLB,, | Performed by: UROLOGY

## 2024-01-17 PROCEDURE — 3075F SYST BP GE 130 - 139MM HG: CPT | Mod: CPTII,S$GLB,, | Performed by: UROLOGY

## 2024-01-17 PROCEDURE — 99999 PR PBB SHADOW E&M-EST. PATIENT-LVL IV: CPT | Mod: PBBFAC,,, | Performed by: UROLOGY

## 2024-01-17 PROCEDURE — 3008F BODY MASS INDEX DOCD: CPT | Mod: CPTII,S$GLB,, | Performed by: UROLOGY

## 2024-01-17 PROCEDURE — 1159F MED LIST DOCD IN RCRD: CPT | Mod: CPTII,S$GLB,, | Performed by: UROLOGY

## 2024-01-17 RX ORDER — VIBEGRON 75 MG/1
75 TABLET, FILM COATED ORAL DAILY
Qty: 30 TABLET | Refills: 11 | Status: SHIPPED | OUTPATIENT
Start: 2024-01-17 | End: 2024-01-25 | Stop reason: ALTCHOICE

## 2024-01-17 NOTE — PROGRESS NOTES
CHIEF COMPLAINT:    Mrs. Dee is a 53 y.o. female presenting for a follow up on mixed incontinence.      PRESENTING ILLNESS:    Ruth Ann Dee is a 53 y.o. female who presents with a history of stress incontinence.  I had attempted a retropubic sling but had to abort it secondary to trocar injury to the urethra.  She has been on oxybutynin 15 mg.  She state it helps with her symptoms greatly.  She states that she was on hormone replacement but then developed post menopausal bleeding and was found to have an endometrial polyp.  This was removed by Dr. Debra Vale and she was able to then resume HRT.  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     REVIEW OF SYSTEMS:    Review of Systems   Constitutional: Negative.    HENT: Negative.     Eyes: Negative.    Respiratory: Negative.     Cardiovascular: Negative.    Gastrointestinal: Negative.    Genitourinary: Negative.    Musculoskeletal: Negative.    Skin: Negative.    Neurological: Negative.    Endo/Heme/Allergies: Negative.    Psychiatric/Behavioral: Negative.         PATIENT HISTORY:    Past Medical History:   Diagnosis Date    a H/O Palpitations     a Mitral Valve Prolapse Ruled Out     10/4/15 Echo (Dr. PERLA Olivas) = Entirely Normal    e Abnormal Thyroid Function Tests     Dr. Juliette Sandifer ([facility]); Repeat TFTs Were Entirely Normal; In 09/2020    e Family Hx Of Thyroid Disorder     10/20/15 TSH And FT4 = Normal    g Chronic Idiopathic Thrombocytosis     Dr. Kip Valenzuela's 11/20/15 OV Note Reviewed; He Ordered A Lab Work Up Ordered    j Ascending Colon Diverticulosis     Dr. Kyle urbina Internal Hemorrhoids     Dr. Kyle London    k Family Hx Of Breast Cancer     k Fibrocystic Breast Changes     l Bilateral Hand Osteoarthritis     10/20/15 RF, BEVERLEY, UA, ESR And CRP = Normal    l Family Hx Of Rheumatoid  Arthritis     Her Mother  From This; 10/20/15 RF, BEVERLEY, UA, ESR And CRP = Normal    l Family Hx Of SLE     10/20/15 RF, BEVERLEY, UA, ESR And CRP = Normal    l Generalized Arthralgias C/W Fibromyalgia ###    10/20/15 RF, BEVERLEY, UA, ESR And CRP = Normal    o Alopecia     10/2/20 Referred To Dr. Lilli Drummond    Wellness Visit 10/02/2020        Past Surgical History:   Procedure Laterality Date    breast lift      BREAST SURGERY      BUNIONECTOMY Right     COLONOSCOPY  2018    Dr. London    CREATION OF URETHRAL SLING USING POLYPROPYLENE TAPE BY RETROPUBIC APPROACH N/A 10/25/2022    Procedure: SLING, RETROPUBIC WITH SPARC;  Surgeon: Samantha Cardona MD;  Location: [address];  Service: Urology;  Laterality: N/A;  Procedure Aborted secondary to urethra injury.      CYSTOSCOPY N/A 10/25/2022    Procedure: CYSTOSCOPY;  Surgeon: Samantha Cardona MD;  Location: [facility] OR [address] [place] [address];  Service: Urology;  Laterality: N/A;    HYSTEROSCOPIC POLYPECTOMY OF UTERUS N/A 2023    Procedure: POLYPECTOMY, UTERUS, HYSTEROSCOPIC;  Surgeon: Debra Vale MD;  Location: [facility];  Service: OB/GYN;  Laterality: N/A;    HYSTEROSCOPY WITH DILATION AND CURETTAGE OF UTERUS N/A 2023    Procedure: HYSTEROSCOPY, WITH DILATION AND CURETTAGE OF UTERUS;  Surgeon: Debra Vale MD;  Location: [facility];  Service: OB/GYN;  Laterality: N/A;    LAPAROSCOPIC CHOLECYSTECTOMY N/A 2022    Procedure: CHOLECYSTECTOMY, LAPAROSCOPIC;  Surgeon: Rosie Alejandra MD;  [facility];  Service: General;  Laterality: N/A;    REMOVAL OF CATHETER  2022    Procedure: REMOVAL, CATHETER;  Surgeon: Samantha Cardona MD;  Location: [facility] OR [address] [place] [address];  Service: Urology;;    TUBAL LIGATION      VOIDING URETHROCYSTOGRAPHY N/A 2022    Procedure: CYSTOURETHROGRAM, VOIDING;  Surgeon: Samantha Cardona MD;  Location: [facility] OR [address] [place] [address];  Service: Urology;  Laterality: N/A;       Family History   Problem Relation Age of Onset    Arthritis  Mother     Cancer Mother     Hypertension Mother     Thyroid disease Mother     Osteoporosis Mother     Breast cancer Mother         50's    Thyroid nodules Mother     Hypertension Father     Pacemaker/defibrilator Father 76    Breast cancer Paternal Grandmother         age unknown    Breast cancer Paternal Cousin         age unknown    Raynaud syndrome Sister     Heart attack Cousin 47        (mom's side)     Heart attack Paternal Grandfather 82       Social History     Socioeconomic History    Marital status:    Tobacco Use    Smoking status: Never     Passive exposure: Never    Smokeless tobacco: Never   Substance and Sexual Activity    Alcohol use: Yes     Comment: occaisionally    Drug use: Never       Allergies:  Adhesive    Medications:  Outpatient Encounter Medications as of 1/17/2024   Medication Sig Dispense Refill    ascorbic acid/zinc (ZINC WITH VITAMIN C ORAL) Take 3 tablets by mouth once daily.      aspirin (ECOTRIN) 81 MG EC tablet Take 81 mg by mouth once daily.      fluticasone propionate (FLONASE) 50 mcg/actuation nasal spray 2 sprays (100 mcg total) by Each Nostril route once daily. 11.1 mL 1    hydroxychloroquine (PLAQUENIL) 200 mg tablet Take 1 tablet (200 mg total) by mouth once daily. (Patient taking differently: Take 200 mg by mouth every morning.) 30 tablet 6    MULTIVITAMIN ORAL Take 1 tablet by mouth once daily.      omeprazole (PRILOSEC) 40 MG capsule TAKE 1 CAPSULE(40 MG) BY MOUTH EVERY MORNING 30 capsule 2    oxybutynin (DITROPAN XL) 15 MG TR24 Take 1 tablet (15 mg total) by mouth once daily. 30 tablet 0    progesterone (PROMETRIUM) 200 MG capsule Take by mouth every evening.      spironolactone (ALDACTONE) 100 MG tablet Take 100 mg by mouth every morning.      vitamin D (VITAMIN D3) 1000 units Tab Take 1,000 Units by mouth once daily.       No facility-administered encounter medications on file as of 1/17/2024.         PHYSICAL EXAMINATION:    The patient generally appears in  good health, is appropriately interactive, and is in no apparent distress.    Skin: No lesions.    Mental: Cooperative with normal affect.    Neuro: Grossly intact.    HEENT: Normal. No evidence of lymphadenopathy.    Chest:  normal inspiratory effort.    Abdomen: Soft, non-tender. No masses or organomegaly. Bladder is not palpable. No evidence of flank discomfort. No evidence of inguinal hernia.    Extremities: No clubbing, cyanosis, or edema    Normal external female genitalia  Urethral meatus is normal  Urethra and bladder are nontender to bimanual exam  Well supported anteriorly and posteriorly   Uterus and cervix are normal  No adnexal masses  PVR by catheterization was 30 ml    LABS:    Lab Results   Component Value Date    BUN 18 11/21/2023    CREATININE 0.62 11/21/2023       UA 1.025, pH 5, tr protein, otherwise, negative. (Catheterized specimen)  Her voided had a trace of blood prompting the catheterized specimen.     IMPRESSION:    Urge predominant mixed incontinence    PLAN:    1.  Due to the concern for long term use of antimuscarinics and cognitive dysfunction, switched to Vibegron.  Coupon and Rx provided  2.  Follow up in 1 year, sooner if there are issues with the Beta 3 agonist.     I spent 30 minutes with the patient of which more than half was spent in direct consultation with the patient in regards to our treatment and plan.

## 2024-01-23 ENCOUNTER — PATIENT MESSAGE (OUTPATIENT)
Dept: UROLOGY | Facility: CLINIC | Age: 54
End: 2024-01-23
Payer: COMMERCIAL

## 2024-01-23 DIAGNOSIS — N39.41 URGE INCONTINENCE: Primary | ICD-10-CM

## 2024-01-25 ENCOUNTER — PATIENT MESSAGE (OUTPATIENT)
Dept: UROLOGY | Facility: CLINIC | Age: 54
End: 2024-01-25
Payer: COMMERCIAL

## 2024-01-25 DIAGNOSIS — N39.41 URGE INCONTINENCE: Primary | ICD-10-CM

## 2024-01-25 DIAGNOSIS — N32.89 BLADDER SPASM: ICD-10-CM

## 2024-01-25 RX ORDER — TROSPIUM CHLORIDE ER 60 MG/1
60 CAPSULE ORAL DAILY
Qty: 30 CAPSULE | Refills: 11 | Status: SHIPPED | OUTPATIENT
Start: 2024-01-25 | End: 2024-02-02

## 2024-01-25 NOTE — TELEPHONE ENCOUNTER
Received a notice of denial after a preauth was done.  They will not cover Gemtesa unless solifenacin or trospium have been tried.  Chose the trospium as it is the safer medication.

## 2024-02-02 RX ORDER — TROSPIUM CHLORIDE 20 MG/1
20 TABLET, FILM COATED ORAL 2 TIMES DAILY
Qty: 60 TABLET | Refills: 11 | Status: SHIPPED | OUTPATIENT
Start: 2024-02-02 | End: 2024-04-19

## 2024-02-02 NOTE — TELEPHONE ENCOUNTER
Received a message from the patient that she needed trospium 20 mg that the trospium XR is not covered.  This was sent to her preferred pharmacy and the 60 mg was discontinence

## 2024-02-06 ENCOUNTER — PATIENT MESSAGE (OUTPATIENT)
Dept: UROLOGY | Facility: CLINIC | Age: 54
End: 2024-02-06
Payer: COMMERCIAL

## 2024-04-12 ENCOUNTER — PATIENT MESSAGE (OUTPATIENT)
Dept: UROLOGY | Facility: CLINIC | Age: 54
End: 2024-04-12
Payer: COMMERCIAL

## 2024-04-12 DIAGNOSIS — N39.41 URGE INCONTINENCE: Primary | ICD-10-CM

## 2024-04-12 DIAGNOSIS — N32.89 BLADDER SPASM: ICD-10-CM

## 2024-04-19 ENCOUNTER — PATIENT MESSAGE (OUTPATIENT)
Dept: UROLOGY | Facility: CLINIC | Age: 54
End: 2024-04-19
Payer: COMMERCIAL

## 2024-04-19 RX ORDER — TROSPIUM CHLORIDE ER 60 MG/1
60 CAPSULE ORAL DAILY
Qty: 30 CAPSULE | Refills: 11 | Status: SHIPPED | OUTPATIENT
Start: 2024-04-19 | End: 2025-04-19

## 2024-05-22 DIAGNOSIS — M35.9 UNDIFFERENTIATED CONNECTIVE TISSUE DISEASE: ICD-10-CM

## 2024-05-22 DIAGNOSIS — R76.8 POSITIVE ANA (ANTINUCLEAR ANTIBODY): ICD-10-CM

## 2024-05-28 RX ORDER — HYDROXYCHLOROQUINE SULFATE 200 MG/1
200 TABLET, FILM COATED ORAL
Qty: 30 TABLET | Refills: 6 | Status: SHIPPED | OUTPATIENT
Start: 2024-05-28

## 2025-04-17 DIAGNOSIS — N32.89 BLADDER SPASM: ICD-10-CM

## 2025-04-17 DIAGNOSIS — N39.41 URGE INCONTINENCE: ICD-10-CM

## 2025-04-21 RX ORDER — TROSPIUM CHLORIDE ER 60 MG/1
60 CAPSULE ORAL
Qty: 30 CAPSULE | Refills: 0 | Status: SHIPPED | OUTPATIENT
Start: 2025-04-21

## 2025-04-24 ENCOUNTER — PATIENT MESSAGE (OUTPATIENT)
Dept: UROLOGY | Facility: CLINIC | Age: 55
End: 2025-04-24
Payer: COMMERCIAL

## (undated) DEVICE — DRAPE STERI INSTRUMENT 1018

## (undated) DEVICE — PACK LAPSCP/PELVSCPY III TIBRN

## (undated) DEVICE — PACK PERI/GYN OPTIMA

## (undated) DEVICE — CATH POLLACK OPEN-END FLEXI-TI

## (undated) DEVICE — SUT 2-0 VICRYL / SH (J417)

## (undated) DEVICE — SET IRR URLGY 2LINE UNIV SPIKE

## (undated) DEVICE — GOWN SURGICAL X-LARGE

## (undated) DEVICE — SUT 3-0 VICRYL SH CR/8 18

## (undated) DEVICE — SOL NS 1000CC

## (undated) DEVICE — RETRACTOR STERILE PLASTIC G2

## (undated) DEVICE — SUT 3-0 VICRYL / RB-1

## (undated) DEVICE — TRAY CYSTO BASIN OMC

## (undated) DEVICE — ADHESIVE DERMABOND ADVANCED

## (undated) DEVICE — SYR 10CC LUER LOCK

## (undated) DEVICE — DRAPE UINDERBUT GRAD PCH

## (undated) DEVICE — TOWEL OR DISP STRL BLUE 4/PK

## (undated) DEVICE — SPONGE DERMACEA GAUZE 4X4

## (undated) DEVICE — SOL IRR WATER STRL 3000 ML

## (undated) DEVICE — TRAY CATH FOL SIL URIMTR 16FR

## (undated) DEVICE — NDL HYPO REG 25G X 1 1/2

## (undated) DEVICE — CLIPPER BLADE MOD 4406 (CAREF)

## (undated) DEVICE — TRAY SKIN SCRUB WET PREMIUM

## (undated) DEVICE — ELECTRODE REM PLYHSV RETURN 9

## (undated) DEVICE — TUBING SUC UNIV W/CONN 12FT

## (undated) DEVICE — BANDAGE ADHESIVE PLAS STRL 1X3

## (undated) DEVICE — HOOK STAY ELAS 5MM 8EA/PK

## (undated) DEVICE — LUBRICANT SURGILUBE 2 OZ

## (undated) DEVICE — SYR ONLY LUER LOCK 20CC

## (undated) DEVICE — SYR 50ML CATH TIP

## (undated) DEVICE — TRAY MINOR GEN SURG OMC